# Patient Record
Sex: MALE | Race: WHITE | Employment: FULL TIME | URBAN - METROPOLITAN AREA
[De-identification: names, ages, dates, MRNs, and addresses within clinical notes are randomized per-mention and may not be internally consistent; named-entity substitution may affect disease eponyms.]

---

## 2021-02-12 LAB
CREATININE, EXTERNAL: 0.86
HBA1C MFR BLD HPLC: 8.5 %
MICROALBUMIN UR TEST STR-MCNC: 36.2 MG/DL

## 2021-05-07 ENCOUNTER — OFFICE VISIT (OUTPATIENT)
Dept: ENDOCRINOLOGY | Age: 71
End: 2021-05-07
Payer: MEDICARE

## 2021-05-07 DIAGNOSIS — E11.65 TYPE 2 DIABETES MELLITUS WITH HYPERGLYCEMIA, WITHOUT LONG-TERM CURRENT USE OF INSULIN (HCC): Primary | ICD-10-CM

## 2021-05-07 PROCEDURE — 3046F HEMOGLOBIN A1C LEVEL >9.0%: CPT | Performed by: INTERNAL MEDICINE

## 2021-05-07 PROCEDURE — 99204 OFFICE O/P NEW MOD 45 MIN: CPT | Performed by: INTERNAL MEDICINE

## 2021-05-07 PROCEDURE — 1101F PT FALLS ASSESS-DOCD LE1/YR: CPT | Performed by: INTERNAL MEDICINE

## 2021-05-07 PROCEDURE — G8536 NO DOC ELDER MAL SCRN: HCPCS | Performed by: INTERNAL MEDICINE

## 2021-05-07 PROCEDURE — G8421 BMI NOT CALCULATED: HCPCS | Performed by: INTERNAL MEDICINE

## 2021-05-07 PROCEDURE — G8427 DOCREV CUR MEDS BY ELIG CLIN: HCPCS | Performed by: INTERNAL MEDICINE

## 2021-05-07 PROCEDURE — 3017F COLORECTAL CA SCREEN DOC REV: CPT | Performed by: INTERNAL MEDICINE

## 2021-05-07 PROCEDURE — 2022F DILAT RTA XM EVC RTNOPTHY: CPT | Performed by: INTERNAL MEDICINE

## 2021-05-07 PROCEDURE — G8432 DEP SCR NOT DOC, RNG: HCPCS | Performed by: INTERNAL MEDICINE

## 2021-05-07 RX ORDER — SITAGLIPTIN AND METFORMIN HYDROCHLORIDE 50; 1000 MG/1; MG/1
1 TABLET, FILM COATED ORAL 2 TIMES DAILY
COMMUNITY
Start: 2021-02-21

## 2021-05-07 RX ORDER — IRBESARTAN 300 MG/1
1 TABLET ORAL DAILY
COMMUNITY
Start: 2021-01-31

## 2021-05-07 RX ORDER — AMLODIPINE BESYLATE 5 MG/1
1 TABLET ORAL DAILY
COMMUNITY
Start: 2020-12-06

## 2021-05-07 RX ORDER — METOPROLOL SUCCINATE 100 MG/1
1 TABLET, EXTENDED RELEASE ORAL DAILY
COMMUNITY
Start: 2020-12-06

## 2021-05-07 RX ORDER — GLIMEPIRIDE 4 MG/1
1 TABLET ORAL 2 TIMES DAILY
COMMUNITY
Start: 2021-01-03

## 2021-05-07 RX ORDER — TERAZOSIN 5 MG/1
1 CAPSULE ORAL
COMMUNITY
Start: 2020-12-20

## 2021-05-07 RX ORDER — ROSUVASTATIN CALCIUM 40 MG/1
1 TABLET, COATED ORAL DAILY
COMMUNITY
Start: 2021-01-17

## 2021-05-07 RX ORDER — MINERAL OIL
180 ENEMA (ML) RECTAL DAILY
COMMUNITY

## 2021-05-07 RX ORDER — HYDROCHLOROTHIAZIDE 25 MG/1
1 TABLET ORAL DAILY
COMMUNITY
Start: 2020-12-27

## 2021-05-07 NOTE — PROGRESS NOTES
HPI:  This is  a 71-year-old white male with a history of type 2 diabetes mellitus diagnosed about 10 years ago who has been on a number of different medications for his diabetes referred for evaluation and management. He tells me that he has been on and off Metformin and glimepiride. He was previously seen at [facility] [place] and lost 10 pounds and improved his blood sugar but has since regained that weight. His most recent A1c is 8.5%. Past medical history   he has a history of coronary disease  he has a history of prostate cancer  he has a history of DVT    Current diabetes medications  Janumet 50/1000 twice daily  Jardiance 25 mg daily  Glimepiride 4 mg daily    He checks his blood sugars in the morning and for the last year they have ranged between 202 150. He is very fastidious about recording his blood sugars every morning. The average for the last year is 236. Breakfast is usually a bowl of cereal and a diet soda. Usually does not have lunch. Dinner can be a taco salad or steak potatoes and green beans or stirfry with fried rice or spaghetti or too much younger. He snacks on nuts or occasionally another sandwich at bedtime. He is minimally physically active. Review of Systems - General ROS: negative  Psychological ROS: negative  Ophthalmic ROS: negative  ENT ROS: negative  Respiratory ROS: no cough, shortness of breath, or wheezing  Cardiovascular ROS: no chest pain or dyspnea on exertion  Gastrointestinal ROS: no abdominal pain, change in bowel habits, or black or bloody stools  Genito-Urinary ROS: positive for - urinary frequency/urgency  Musculoskeletal ROS: negative  Neurological ROS: no TIA or stroke symptoms  Dermatological ROS: negative    Emanation  Blood pressure 152/96  Pulse 95  Weight 100 kg  HEENT unremarkable  Lungs clear  Heart reveals a regular rate and rhythm without murmurs rubs or gallops  Abdomen obese  Extremities unremarkable.   The feet are dry without ulceration or tissue breakdown. Diabetic foot exam:     Left Foot:   Visual Exam: normal    Pulse DP: 2+ (normal)   Filament test: normal sensation    Vibratory sensation: normal      Right Foot:   Visual Exam: normal    Pulse DP: 2+ (normal)   Filament test: normal sensation    Vibratory sensation: normal          Impression   1. type 2 diabetes mellitus with a recent A1c of 8.5% with an average glucose of 236 fasting  2. History of DVT  3. Obesity  4. financial constraints    Plan: We discussed at length all options in terms of pharmacologic therapy for his type 2 diabetes mellitus. I advised him that we needed to get his fasting glucose less than 150. We have elected to do the followin. I have continue the above regimen for the short-term  2. I have written a prescription for Ozempic to be titrated if affordable  3. I advised him that I doubted that the [address] would be affordable and certainly more expensive than the Jardiance which he already struggles to pay for  4. If Ozempic and or other GLP's are not affordable or realistic, we discussed at length the addition of bedtime insulin as well as efforts to reduce his carbohydrate intake and increase his physical activity. He does tell me that he did this before with Ashely Lucas and believes he could do this again if he had to.  5.  I will see him back in 2 months.

## 2023-05-11 RX ORDER — IRBESARTAN 300 MG/1
1 TABLET ORAL DAILY
COMMUNITY
Start: 2021-01-31

## 2023-05-11 RX ORDER — ROSUVASTATIN CALCIUM 40 MG/1
1 TABLET, COATED ORAL DAILY
COMMUNITY
Start: 2021-01-17

## 2023-05-11 RX ORDER — SITAGLIPTIN AND METFORMIN HYDROCHLORIDE 1000; 50 MG/1; MG/1
1 TABLET, FILM COATED ORAL 2 TIMES DAILY
COMMUNITY
Start: 2021-02-21

## 2023-05-11 RX ORDER — AMLODIPINE BESYLATE 5 MG/1
1 TABLET ORAL DAILY
COMMUNITY
Start: 2020-12-06

## 2023-05-11 RX ORDER — HYDROCHLOROTHIAZIDE 25 MG/1
1 TABLET ORAL DAILY
COMMUNITY
Start: 2020-12-27

## 2023-05-11 RX ORDER — FEXOFENADINE HCL 180 MG/1
180 TABLET ORAL DAILY
COMMUNITY

## 2023-05-11 RX ORDER — METOPROLOL SUCCINATE 100 MG/1
1 TABLET, EXTENDED RELEASE ORAL DAILY
COMMUNITY
Start: 2020-12-06

## 2023-05-11 RX ORDER — TERAZOSIN 5 MG/1
1 CAPSULE ORAL
COMMUNITY
Start: 2020-12-20

## 2023-05-11 RX ORDER — GLIMEPIRIDE 4 MG/1
1 TABLET ORAL 2 TIMES DAILY
COMMUNITY
Start: 2021-01-03

## 2023-08-10 ENCOUNTER — HOSPITAL ENCOUNTER (OUTPATIENT)
Facility: HOSPITAL | Age: 73
Discharge: HOME | End: 2023-08-10
Payer: MEDICARE

## 2023-08-10 DIAGNOSIS — R05.9 COUGH, UNSPECIFIED TYPE: ICD-10-CM

## 2023-08-10 PROCEDURE — 71046 X-RAY EXAM CHEST 2 VIEWS: CPT

## 2023-09-21 ENCOUNTER — TRANSCRIBE ORDERS (OUTPATIENT)
Facility: HOSPITAL | Age: 73
End: 2023-09-21

## 2023-09-21 ENCOUNTER — HOSPITAL ENCOUNTER (OUTPATIENT)
Facility: HOSPITAL | Age: 73
Discharge: HOME | End: 2023-09-21
Payer: MEDICARE

## 2023-09-21 DIAGNOSIS — R93.89 ABNORMAL CHEST CT: Primary | ICD-10-CM

## 2023-09-21 DIAGNOSIS — R93.89 ABNORMAL CHEST CT: ICD-10-CM

## 2023-09-21 PROCEDURE — 71046 X-RAY EXAM CHEST 2 VIEWS: CPT

## 2024-04-02 ENCOUNTER — HOSPITAL ENCOUNTER (INPATIENT)
Facility: HOSPITAL | Age: 74
LOS: 7 days | Discharge: HOME | DRG: 193 | End: 2024-04-09
Attending: EMERGENCY MEDICINE | Admitting: STUDENT IN AN ORGANIZED HEALTH CARE EDUCATION/TRAINING PROGRAM
Payer: MEDICARE

## 2024-04-02 ENCOUNTER — APPOINTMENT (OUTPATIENT)
Facility: HOSPITAL | Age: 74
DRG: 193 | End: 2024-04-02
Payer: MEDICARE

## 2024-04-02 DIAGNOSIS — Z79.4 TYPE 2 DIABETES MELLITUS WITH HYPERGLYCEMIA, WITH LONG-TERM CURRENT USE OF INSULIN (HCC): ICD-10-CM

## 2024-04-02 DIAGNOSIS — E11.65 TYPE 2 DIABETES MELLITUS WITH HYPERGLYCEMIA, WITH LONG-TERM CURRENT USE OF INSULIN (HCC): ICD-10-CM

## 2024-04-02 DIAGNOSIS — J96.01 ACUTE RESPIRATORY FAILURE WITH HYPOXIA (HCC): Primary | ICD-10-CM

## 2024-04-02 DIAGNOSIS — E11.65 TYPE 2 DIABETES MELLITUS WITH HYPERGLYCEMIA (HCC): ICD-10-CM

## 2024-04-02 PROBLEM — J18.9 PNEUMONIA DUE TO INFECTIOUS AGENT: Status: ACTIVE | Noted: 2024-04-02

## 2024-04-02 LAB
ALBUMIN SERPL-MCNC: 2.4 G/DL (ref 3.5–5)
ALBUMIN/GLOB SERPL: 0.4 (ref 1.1–2.2)
ALP SERPL-CCNC: 67 U/L (ref 45–117)
ALT SERPL-CCNC: 17 U/L (ref 12–78)
ANION GAP SERPL CALC-SCNC: 5 MMOL/L (ref 5–15)
AST SERPL-CCNC: 11 U/L (ref 15–37)
BASOPHILS # BLD: 0 K/UL (ref 0–0.1)
BASOPHILS NFR BLD: 0 % (ref 0–1)
BILIRUB SERPL-MCNC: 0.3 MG/DL (ref 0.2–1)
BUN SERPL-MCNC: 21 MG/DL (ref 6–20)
BUN/CREAT SERPL: 23 (ref 12–20)
CALCIUM SERPL-MCNC: 8.6 MG/DL (ref 8.5–10.1)
CHLORIDE SERPL-SCNC: 100 MMOL/L (ref 97–108)
CO2 SERPL-SCNC: 29 MMOL/L (ref 21–32)
CREAT SERPL-MCNC: 0.92 MG/DL (ref 0.7–1.3)
DIFFERENTIAL METHOD BLD: ABNORMAL
EOSINOPHIL # BLD: 0.1 K/UL (ref 0–0.4)
EOSINOPHIL NFR BLD: 1 % (ref 0–7)
ERYTHROCYTE [DISTWIDTH] IN BLOOD BY AUTOMATED COUNT: 14.5 % (ref 11.5–14.5)
FLUAV AG NPH QL IA: NEGATIVE
FLUBV AG NOSE QL IA: NEGATIVE
GLOBULIN SER CALC-MCNC: 6.4 G/DL (ref 2–4)
GLUCOSE SERPL-MCNC: 127 MG/DL (ref 65–100)
HCT VFR BLD AUTO: 39.6 % (ref 36.6–50.3)
HGB BLD-MCNC: 12.4 G/DL (ref 12.1–17)
IMM GRANULOCYTES # BLD AUTO: 0 K/UL (ref 0–0.04)
IMM GRANULOCYTES NFR BLD AUTO: 0 % (ref 0–0.5)
LYMPHOCYTES # BLD: 2.1 K/UL (ref 0.8–3.5)
LYMPHOCYTES NFR BLD: 18 % (ref 12–49)
MCH RBC QN AUTO: 29.5 PG (ref 26–34)
MCHC RBC AUTO-ENTMCNC: 31.3 G/DL (ref 30–36.5)
MCV RBC AUTO: 94.1 FL (ref 80–99)
MONOCYTES # BLD: 0.9 K/UL (ref 0–1)
MONOCYTES NFR BLD: 8 % (ref 5–13)
NEUTS BAND NFR BLD MANUAL: 3 %
NEUTS SEG # BLD: 8.4 K/UL (ref 1.8–8)
NEUTS SEG NFR BLD: 70 % (ref 32–75)
NRBC # BLD: 0 K/UL (ref 0–0.01)
NRBC BLD-RTO: 0 PER 100 WBC
NT PRO BNP: 328 PG/ML
PLATELET # BLD AUTO: 348 K/UL (ref 150–400)
PMV BLD AUTO: 8.6 FL (ref 8.9–12.9)
POTASSIUM SERPL-SCNC: 3.9 MMOL/L (ref 3.5–5.1)
PROT SERPL-MCNC: 8.8 G/DL (ref 6.4–8.2)
RBC # BLD AUTO: 4.21 M/UL (ref 4.1–5.7)
RBC MORPH BLD: ABNORMAL
SARS-COV-2 RDRP RESP QL NAA+PROBE: NOT DETECTED
SODIUM SERPL-SCNC: 134 MMOL/L (ref 136–145)
SOURCE: NORMAL
TROPONIN I SERPL HS-MCNC: 5 NG/L (ref 0–76)
WBC # BLD AUTO: 11.5 K/UL (ref 4.1–11.1)

## 2024-04-02 PROCEDURE — 6360000002 HC RX W HCPCS: Performed by: STUDENT IN AN ORGANIZED HEALTH CARE EDUCATION/TRAINING PROGRAM

## 2024-04-02 PROCEDURE — 2580000003 HC RX 258: Performed by: STUDENT IN AN ORGANIZED HEALTH CARE EDUCATION/TRAINING PROGRAM

## 2024-04-02 PROCEDURE — 87635 SARS-COV-2 COVID-19 AMP PRB: CPT

## 2024-04-02 PROCEDURE — 36415 COLL VENOUS BLD VENIPUNCTURE: CPT

## 2024-04-02 PROCEDURE — 1100000000 HC RM PRIVATE

## 2024-04-02 PROCEDURE — 84484 ASSAY OF TROPONIN QUANT: CPT

## 2024-04-02 PROCEDURE — 99285 EMERGENCY DEPT VISIT HI MDM: CPT

## 2024-04-02 PROCEDURE — 85025 COMPLETE CBC W/AUTO DIFF WBC: CPT

## 2024-04-02 PROCEDURE — 6370000000 HC RX 637 (ALT 250 FOR IP): Performed by: EMERGENCY MEDICINE

## 2024-04-02 PROCEDURE — 83880 ASSAY OF NATRIURETIC PEPTIDE: CPT

## 2024-04-02 PROCEDURE — 93005 ELECTROCARDIOGRAM TRACING: CPT | Performed by: EMERGENCY MEDICINE

## 2024-04-02 PROCEDURE — 96365 THER/PROPH/DIAG IV INF INIT: CPT

## 2024-04-02 PROCEDURE — 2580000003 HC RX 258: Performed by: EMERGENCY MEDICINE

## 2024-04-02 PROCEDURE — 6360000002 HC RX W HCPCS: Performed by: EMERGENCY MEDICINE

## 2024-04-02 PROCEDURE — 87804 INFLUENZA ASSAY W/OPTIC: CPT

## 2024-04-02 PROCEDURE — 80053 COMPREHEN METABOLIC PANEL: CPT

## 2024-04-02 PROCEDURE — 71275 CT ANGIOGRAPHY CHEST: CPT

## 2024-04-02 RX ORDER — MORPHINE SULFATE 2 MG/ML
2 INJECTION, SOLUTION INTRAMUSCULAR; INTRAVENOUS EVERY 4 HOURS PRN
Status: DISCONTINUED | OUTPATIENT
Start: 2024-04-02 | End: 2024-04-03

## 2024-04-02 RX ORDER — ACETAMINOPHEN 325 MG/1
650 TABLET ORAL EVERY 4 HOURS PRN
Status: DISCONTINUED | OUTPATIENT
Start: 2024-04-02 | End: 2024-04-09 | Stop reason: HOSPADM

## 2024-04-02 RX ORDER — DOXYCYCLINE HYCLATE 100 MG
100 TABLET ORAL
Status: COMPLETED | OUTPATIENT
Start: 2024-04-02 | End: 2024-04-02

## 2024-04-02 RX ORDER — HYDROCODONE BITARTRATE AND ACETAMINOPHEN 5; 325 MG/1; MG/1
1 TABLET ORAL EVERY 6 HOURS PRN
Status: DISCONTINUED | OUTPATIENT
Start: 2024-04-02 | End: 2024-04-09 | Stop reason: HOSPADM

## 2024-04-02 RX ORDER — AZITHROMYCIN 250 MG/1
500 TABLET, FILM COATED ORAL ONCE
Status: COMPLETED | OUTPATIENT
Start: 2024-04-03 | End: 2024-04-03

## 2024-04-02 RX ORDER — AZITHROMYCIN 250 MG/1
250 TABLET, FILM COATED ORAL DAILY
Status: DISCONTINUED | OUTPATIENT
Start: 2024-04-04 | End: 2024-04-06

## 2024-04-02 RX ADMIN — SODIUM CHLORIDE 1000 MG: 900 INJECTION INTRAVENOUS at 20:51

## 2024-04-02 RX ADMIN — DOXYCYCLINE HYCLATE 100 MG: 100 TABLET, COATED ORAL at 20:51

## 2024-04-02 RX ADMIN — PIPERACILLIN SODIUM AND TAZOBACTAM SODIUM 4500 MG: 4; .5 INJECTION, POWDER, LYOPHILIZED, FOR SOLUTION INTRAVENOUS at 23:33

## 2024-04-02 ASSESSMENT — PAIN SCALES - GENERAL: PAINLEVEL_OUTOF10: 0

## 2024-04-02 ASSESSMENT — PAIN - FUNCTIONAL ASSESSMENT: PAIN_FUNCTIONAL_ASSESSMENT: 0-10

## 2024-04-02 ASSESSMENT — LIFESTYLE VARIABLES
HOW OFTEN DO YOU HAVE A DRINK CONTAINING ALCOHOL: NEVER
HOW MANY STANDARD DRINKS CONTAINING ALCOHOL DO YOU HAVE ON A TYPICAL DAY: PATIENT DOES NOT DRINK

## 2024-04-02 NOTE — ED PROVIDER NOTES
[facility] EMERGENCY DEPT  EMERGENCY DEPARTMENT ENCOUNTER       Pt Name: Irwin Pineda  MRN: [id number]  Birthdate 1950  Date of evaluation: 4/2/2024  Provider: González Rodas MD   PCP: Eric Garza MD  Note Started: 4:54 PM EDT 4/2/24     CHIEF COMPLAINT       Chief Complaint   Patient presents with    Shortness of Breath     PCP referred patient to ED for Hypoxia today in office. Patient denies pain or SOB while resting; no hx of COPD, no recent illnesses. 87% RA in triage; placed on 2 L NC.        HISTORY OF PRESENT ILLNESS: 1 or more elements      History From: patient, History limited by: none     Irwin Pineda is a 73 y.o. male with a history of prior DVT not on anticoagulation presents emergency department with hypoxia.    Patient reports 2 weeks ago noted dyspnea on exertion.  No chest pain at that time.  Does report cough.  Over the past 2 days has noted increased fatigue.  Denies hemoptysis.  No leg swelling.  Does report sleeping on his abdomen improved symptoms.  Denies fevers or chills.    Went to PCP, there was hypoxic to 89 and 82 on ambulation.     Please See MDM for Additional Details of the HPI/PMH  Nursing Notes were all reviewed and agreed with or any disagreements were addressed in the HPI.     REVIEW OF SYSTEMS        Positives and Pertinent negatives as per HPI.    PAST HISTORY     Past Medical History:  No past medical history on file.    Past Surgical History:  No past surgical history on file.    Family History:  No family history on file.    Social History:       Allergies:  Allergies   Allergen Reactions    Aspirin Swelling       CURRENT MEDICATIONS      Previous Medications    AMLODIPINE (NORVASC) 5 MG TABLET    Take 1 tablet by mouth daily    EMPAGLIFLOZIN (JARDIANCE) 25 MG TABLET    Take 1 tablet by mouth daily    FEXOFENADINE (ALLEGRA) 180 MG TABLET    Take 1 tablet by mouth daily    GLIMEPIRIDE (AMARYL) 4 MG TABLET    Take 1 tablet by mouth 2 times daily     Glom Filt Rate 89 >60 ml/min/1.73m2    Calcium 8.6 8.5 - 10.1 MG/DL    Total Bilirubin 0.3 0.2 - 1.0 MG/DL    ALT 17 12 - 78 U/L    AST 11 (L) 15 - 37 U/L    Alk Phosphatase 67 45 - 117 U/L    Total Protein 8.8 (H) 6.4 - 8.2 g/dL    Albumin 2.4 (L) 3.5 - 5.0 g/dL    Globulin 6.4 (H) 2.0 - 4.0 g/dL    Albumin/Globulin Ratio 0.4 (L) 1.1 - 2.2     Troponin    Collection Time: 04/02/24  5:16 PM   Result Value Ref Range    Troponin, High Sensitivity 5 0 - 76 ng/L   Brain Natriuretic Peptide    Collection Time: 04/02/24  5:16 PM   Result Value Ref Range    NT Pro- (H) <125 PG/ML   COVID-19, Rapid    Collection Time: 04/02/24  7:20 PM    Specimen: Nasopharyngeal   Result Value Ref Range    Source Nasopharyngeal      SARS-CoV-2, Rapid Not detected NOTD     Rapid influenza A/B antigens    Collection Time: 04/02/24  7:20 PM    Specimen: Nasopharyngeal   Result Value Ref Range    Influenza A Ag Negative NEG      Influenza B Ag Negative NEG         EKG: If performed, independent interpretation documented below in the MDM section     RADIOLOGY:  Non-plain film images such as CT, Ultrasound and MRI are read by the radiologist. Plain radiographic images are visualized and preliminarily interpreted by the ED Provider with the findings documented in the MDM section.     Interpretation per the Radiologist below, if available at the time of this note:     CTA CHEST W WO CONTRAST   Final Result      1. Negative for pulmonary embolus.   2. Significant airspace disease in the right lung likely due to infection.   3. Area of consolidation in the posterior left lower lobe likely related to   infection as well.   4. Bilateral hilar angel enlargement. Several enlarged lymph nodes in the   mediastinum. These are likely reactive.              PROCEDURES   Unless otherwise noted below, none  Procedures     CRITICAL CARE TIME   0    EMERGENCY DEPARTMENT COURSE and DIFFERENTIAL DIAGNOSIS/MDM   Vitals:    Vitals:    04/02/24 1815 04/02/24

## 2024-04-03 ENCOUNTER — APPOINTMENT (OUTPATIENT)
Facility: HOSPITAL | Age: 74
DRG: 193 | End: 2024-04-03
Attending: STUDENT IN AN ORGANIZED HEALTH CARE EDUCATION/TRAINING PROGRAM
Payer: MEDICARE

## 2024-04-03 LAB
ANION GAP SERPL CALC-SCNC: 4 MMOL/L (ref 5–15)
BUN SERPL-MCNC: 22 MG/DL (ref 6–20)
BUN/CREAT SERPL: 24 (ref 12–20)
CALCIUM SERPL-MCNC: 9.4 MG/DL (ref 8.5–10.1)
CHLORIDE SERPL-SCNC: 102 MMOL/L (ref 97–108)
CO2 SERPL-SCNC: 30 MMOL/L (ref 21–32)
CREAT SERPL-MCNC: 0.9 MG/DL (ref 0.7–1.3)
ECHO AV AREA PEAK VELOCITY: 2.5 CM2
ECHO AV AREA PEAK VELOCITY: 2.5 CM2
ECHO AV AREA PEAK VELOCITY: 2.6 CM2
ECHO AV AREA PEAK VELOCITY: 2.6 CM2
ECHO AV AREA VTI: 2.5 CM2
ECHO AV AREA/BSA VTI: 1.2 CM2/M2
ECHO AV CUSP MM: 2.4 CM
ECHO AV MEAN GRADIENT: 5 MMHG
ECHO AV MEAN VELOCITY: 1 M/S
ECHO AV PEAK GRADIENT: 8 MMHG
ECHO AV PEAK GRADIENT: 8 MMHG
ECHO AV PEAK VELOCITY: 1.4 M/S
ECHO AV PEAK VELOCITY: 1.4 M/S
ECHO AV VTI: 23.9 CM
ECHO BSA: 2.08 M2
ECHO LA DIAMETER INDEX: 2.25 CM/M2
ECHO LA DIAMETER: 4.6 CM
ECHO LA VOL A-L A2C: 106 ML (ref 18–58)
ECHO LA VOL A-L A4C: 78 ML (ref 18–58)
ECHO LA VOL MOD A2C: 94 ML (ref 18–58)
ECHO LA VOL MOD A4C: 76 ML (ref 18–58)
ECHO LA VOLUME AREA LENGTH: 91 ML
ECHO LA VOLUME INDEX A-L A2C: 52 ML/M2 (ref 16–34)
ECHO LA VOLUME INDEX A-L A4C: 38 ML/M2 (ref 16–34)
ECHO LA VOLUME INDEX AREA LENGTH: 45 ML/M2 (ref 16–34)
ECHO LA VOLUME INDEX MOD A2C: 46 ML/M2 (ref 16–34)
ECHO LA VOLUME INDEX MOD A4C: 37 ML/M2 (ref 16–34)
ECHO LV E' LATERAL VELOCITY: 8 CM/S
ECHO LV E' SEPTAL VELOCITY: 6 CM/S
ECHO LV FRACTIONAL SHORTENING: 33 % (ref 28–44)
ECHO LV INTERNAL DIMENSION DIASTOLE INDEX: 2.5 CM/M2
ECHO LV INTERNAL DIMENSION DIASTOLIC: 5.1 CM (ref 4.2–5.9)
ECHO LV INTERNAL DIMENSION SYSTOLIC INDEX: 1.67 CM/M2
ECHO LV INTERNAL DIMENSION SYSTOLIC: 3.4 CM
ECHO LV IVSD: 1.5 CM (ref 0.6–1)
ECHO LV MASS 2D: 300.4 G (ref 88–224)
ECHO LV MASS INDEX 2D: 147.3 G/M2 (ref 49–115)
ECHO LV POSTERIOR WALL DIASTOLIC: 1.3 CM (ref 0.6–1)
ECHO LV RELATIVE WALL THICKNESS RATIO: 0.51
ECHO LVOT AREA: 3.5 CM2
ECHO LVOT AV VTI INDEX: 0.74
ECHO LVOT DIAM: 2.1 CM
ECHO LVOT MEAN GRADIENT: 2 MMHG
ECHO LVOT PEAK GRADIENT: 5 MMHG
ECHO LVOT PEAK GRADIENT: 5 MMHG
ECHO LVOT PEAK VELOCITY: 1.1 M/S
ECHO LVOT PEAK VELOCITY: 1.1 M/S
ECHO LVOT STROKE VOLUME INDEX: 30.2 ML/M2
ECHO LVOT SV: 61.6 ML
ECHO LVOT VTI: 17.8 CM
ECHO MV A VELOCITY: 0.96 M/S
ECHO MV AREA VTI: 2.5 CM2
ECHO MV E DECELERATION TIME (DT): 211.3 MS
ECHO MV E VELOCITY: 0.57 M/S
ECHO MV E/A RATIO: 0.59
ECHO MV E/E' LATERAL: 7.13
ECHO MV E/E' RATIO (AVERAGED): 8.31
ECHO MV LVOT VTI INDEX: 1.4
ECHO MV MAX VELOCITY: 1.1 M/S
ECHO MV MEAN GRADIENT: 2 MMHG
ECHO MV MEAN VELOCITY: 0.6 M/S
ECHO MV PEAK GRADIENT: 5 MMHG
ECHO MV VTI: 24.9 CM
ECHO PV MAX VELOCITY: 0.9 M/S
ECHO PV PEAK GRADIENT: 3 MMHG
ECHO RV BASAL DIMENSION: 4.3 CM
ECHO RV FREE WALL PEAK S': 23 CM/S
ECHO RV INTERNAL DIMENSION: 3.9 CM
EKG ATRIAL RATE: 80 BPM
EKG DIAGNOSIS: NORMAL
EKG P AXIS: 33 DEGREES
EKG P-R INTERVAL: 166 MS
EKG Q-T INTERVAL: 398 MS
EKG QRS DURATION: 82 MS
EKG QTC CALCULATION (BAZETT): 459 MS
EKG R AXIS: 15 DEGREES
EKG T AXIS: 17 DEGREES
EKG VENTRICULAR RATE: 80 BPM
GLUCOSE BLD STRIP.AUTO-MCNC: 142 MG/DL (ref 65–117)
GLUCOSE BLD STRIP.AUTO-MCNC: 185 MG/DL (ref 65–117)
GLUCOSE BLD STRIP.AUTO-MCNC: 186 MG/DL (ref 65–117)
GLUCOSE BLD STRIP.AUTO-MCNC: 226 MG/DL (ref 65–117)
GLUCOSE BLD STRIP.AUTO-MCNC: 275 MG/DL (ref 65–117)
GLUCOSE SERPL-MCNC: 139 MG/DL (ref 65–100)
POTASSIUM SERPL-SCNC: 3.6 MMOL/L (ref 3.5–5.1)
PROCALCITONIN SERPL-MCNC: 0.05 NG/ML
SERVICE CMNT-IMP: ABNORMAL
SODIUM SERPL-SCNC: 136 MMOL/L (ref 136–145)

## 2024-04-03 PROCEDURE — 2580000003 HC RX 258: Performed by: STUDENT IN AN ORGANIZED HEALTH CARE EDUCATION/TRAINING PROGRAM

## 2024-04-03 PROCEDURE — 1100000003 HC PRIVATE W/ TELEMETRY

## 2024-04-03 PROCEDURE — 6360000002 HC RX W HCPCS: Performed by: STUDENT IN AN ORGANIZED HEALTH CARE EDUCATION/TRAINING PROGRAM

## 2024-04-03 PROCEDURE — 93306 TTE W/DOPPLER COMPLETE: CPT

## 2024-04-03 PROCEDURE — 80048 BASIC METABOLIC PNL TOTAL CA: CPT

## 2024-04-03 PROCEDURE — 94760 N-INVAS EAR/PLS OXIMETRY 1: CPT

## 2024-04-03 PROCEDURE — 84145 PROCALCITONIN (PCT): CPT

## 2024-04-03 PROCEDURE — 94640 AIRWAY INHALATION TREATMENT: CPT

## 2024-04-03 PROCEDURE — 6370000000 HC RX 637 (ALT 250 FOR IP): Performed by: NURSE PRACTITIONER

## 2024-04-03 PROCEDURE — 6370000000 HC RX 637 (ALT 250 FOR IP): Performed by: STUDENT IN AN ORGANIZED HEALTH CARE EDUCATION/TRAINING PROGRAM

## 2024-04-03 PROCEDURE — 36415 COLL VENOUS BLD VENIPUNCTURE: CPT

## 2024-04-03 PROCEDURE — 6360000002 HC RX W HCPCS: Performed by: NURSE PRACTITIONER

## 2024-04-03 PROCEDURE — 82962 GLUCOSE BLOOD TEST: CPT

## 2024-04-03 PROCEDURE — 2700000000 HC OXYGEN THERAPY PER DAY

## 2024-04-03 RX ORDER — ENOXAPARIN SODIUM 100 MG/ML
40 INJECTION SUBCUTANEOUS DAILY
Status: DISCONTINUED | OUTPATIENT
Start: 2024-04-03 | End: 2024-04-09 | Stop reason: HOSPADM

## 2024-04-03 RX ORDER — INSULIN LISPRO 100 [IU]/ML
0-4 INJECTION, SOLUTION INTRAVENOUS; SUBCUTANEOUS NIGHTLY
Status: DISCONTINUED | OUTPATIENT
Start: 2024-04-03 | End: 2024-04-09 | Stop reason: HOSPADM

## 2024-04-03 RX ORDER — GUAIFENESIN 600 MG/1
600 TABLET, EXTENDED RELEASE ORAL 2 TIMES DAILY
Status: DISCONTINUED | OUTPATIENT
Start: 2024-04-03 | End: 2024-04-06

## 2024-04-03 RX ORDER — METOPROLOL SUCCINATE 50 MG/1
100 TABLET, EXTENDED RELEASE ORAL DAILY
Status: DISCONTINUED | OUTPATIENT
Start: 2024-04-03 | End: 2024-04-09 | Stop reason: HOSPADM

## 2024-04-03 RX ORDER — INSULIN LISPRO 100 [IU]/ML
0-8 INJECTION, SOLUTION INTRAVENOUS; SUBCUTANEOUS
Status: DISCONTINUED | OUTPATIENT
Start: 2024-04-03 | End: 2024-04-09 | Stop reason: HOSPADM

## 2024-04-03 RX ORDER — IPRATROPIUM BROMIDE AND ALBUTEROL SULFATE 2.5; .5 MG/3ML; MG/3ML
1 SOLUTION RESPIRATORY (INHALATION) EVERY 4 HOURS PRN
Status: DISCONTINUED | OUTPATIENT
Start: 2024-04-03 | End: 2024-04-09 | Stop reason: HOSPADM

## 2024-04-03 RX ORDER — SODIUM CHLORIDE 0.9 % (FLUSH) 0.9 %
5-40 SYRINGE (ML) INJECTION EVERY 12 HOURS SCHEDULED
Status: DISCONTINUED | OUTPATIENT
Start: 2024-04-03 | End: 2024-04-09 | Stop reason: HOSPADM

## 2024-04-03 RX ORDER — POLYETHYLENE GLYCOL 3350 17 G/17G
17 POWDER, FOR SOLUTION ORAL DAILY PRN
Status: DISCONTINUED | OUTPATIENT
Start: 2024-04-03 | End: 2024-04-09 | Stop reason: HOSPADM

## 2024-04-03 RX ORDER — SODIUM CHLORIDE 0.9 % (FLUSH) 0.9 %
5-40 SYRINGE (ML) INJECTION PRN
Status: DISCONTINUED | OUTPATIENT
Start: 2024-04-03 | End: 2024-04-09 | Stop reason: HOSPADM

## 2024-04-03 RX ORDER — ROSUVASTATIN CALCIUM 40 MG/1
40 TABLET, COATED ORAL DAILY
Status: DISCONTINUED | OUTPATIENT
Start: 2024-04-03 | End: 2024-04-09 | Stop reason: HOSPADM

## 2024-04-03 RX ORDER — AMLODIPINE BESYLATE 5 MG/1
5 TABLET ORAL DAILY
Status: DISCONTINUED | OUTPATIENT
Start: 2024-04-03 | End: 2024-04-09 | Stop reason: HOSPADM

## 2024-04-03 RX ORDER — SENNA AND DOCUSATE SODIUM 50; 8.6 MG/1; MG/1
2 TABLET, FILM COATED ORAL DAILY PRN
Status: DISCONTINUED | OUTPATIENT
Start: 2024-04-03 | End: 2024-04-09 | Stop reason: HOSPADM

## 2024-04-03 RX ORDER — SODIUM CHLORIDE 9 MG/ML
INJECTION, SOLUTION INTRAVENOUS PRN
Status: DISCONTINUED | OUTPATIENT
Start: 2024-04-03 | End: 2024-04-09 | Stop reason: HOSPADM

## 2024-04-03 RX ORDER — BENZONATATE 100 MG/1
100 CAPSULE ORAL 3 TIMES DAILY PRN
Status: DISCONTINUED | OUTPATIENT
Start: 2024-04-03 | End: 2024-04-09 | Stop reason: HOSPADM

## 2024-04-03 RX ORDER — GUAIFENESIN 400 MG/1
400 TABLET ORAL 4 TIMES DAILY PRN
COMMUNITY

## 2024-04-03 RX ORDER — DEXTROSE MONOHYDRATE 100 MG/ML
INJECTION, SOLUTION INTRAVENOUS CONTINUOUS PRN
Status: DISCONTINUED | OUTPATIENT
Start: 2024-04-03 | End: 2024-04-09 | Stop reason: HOSPADM

## 2024-04-03 RX ORDER — GLUCAGON 1 MG/ML
1 KIT INJECTION PRN
Status: DISCONTINUED | OUTPATIENT
Start: 2024-04-03 | End: 2024-04-09 | Stop reason: HOSPADM

## 2024-04-03 RX ORDER — SODIUM CHLORIDE 9 MG/ML
INJECTION, SOLUTION INTRAVENOUS CONTINUOUS
Status: DISCONTINUED | OUTPATIENT
Start: 2024-04-03 | End: 2024-04-03

## 2024-04-03 RX ORDER — HYDROCHLOROTHIAZIDE 25 MG/1
25 TABLET ORAL DAILY
Status: DISCONTINUED | OUTPATIENT
Start: 2024-04-03 | End: 2024-04-09 | Stop reason: HOSPADM

## 2024-04-03 RX ADMIN — HYDROCHLOROTHIAZIDE 25 MG: 25 TABLET ORAL at 10:14

## 2024-04-03 RX ADMIN — SODIUM CHLORIDE: 9 INJECTION, SOLUTION INTRAVENOUS at 23:13

## 2024-04-03 RX ADMIN — EMPAGLIFLOZIN 25 MG: 25 TABLET, FILM COATED ORAL at 10:14

## 2024-04-03 RX ADMIN — VANCOMYCIN HYDROCHLORIDE 2000 MG: 10 INJECTION, POWDER, LYOPHILIZED, FOR SOLUTION INTRAVENOUS at 00:19

## 2024-04-03 RX ADMIN — AMLODIPINE BESYLATE 5 MG: 5 TABLET ORAL at 10:16

## 2024-04-03 RX ADMIN — ENOXAPARIN SODIUM 40 MG: 100 INJECTION SUBCUTANEOUS at 10:15

## 2024-04-03 RX ADMIN — ARFORMOTEROL TARTRATE: 15 SOLUTION RESPIRATORY (INHALATION) at 20:26

## 2024-04-03 RX ADMIN — GUAIFENESIN 600 MG: 600 TABLET, EXTENDED RELEASE ORAL at 22:51

## 2024-04-03 RX ADMIN — PIPERACILLIN AND TAZOBACTAM 3375 MG: 3; .375 INJECTION, POWDER, LYOPHILIZED, FOR SOLUTION INTRAVENOUS at 14:42

## 2024-04-03 RX ADMIN — AZITHROMYCIN 500 MG: 250 TABLET, FILM COATED ORAL at 10:15

## 2024-04-03 RX ADMIN — VANCOMYCIN HYDROCHLORIDE 1000 MG: 1 INJECTION, POWDER, LYOPHILIZED, FOR SOLUTION INTRAVENOUS at 13:15

## 2024-04-03 RX ADMIN — METOPROLOL SUCCINATE 100 MG: 50 TABLET, EXTENDED RELEASE ORAL at 10:15

## 2024-04-03 RX ADMIN — SODIUM CHLORIDE, PRESERVATIVE FREE 10 ML: 5 INJECTION INTRAVENOUS at 23:30

## 2024-04-03 RX ADMIN — ROSUVASTATIN CALCIUM 40 MG: 40 TABLET, FILM COATED ORAL at 10:14

## 2024-04-03 RX ADMIN — PIPERACILLIN AND TAZOBACTAM 3375 MG: 3; .375 INJECTION, POWDER, LYOPHILIZED, FOR SOLUTION INTRAVENOUS at 23:16

## 2024-04-03 RX ADMIN — PIPERACILLIN AND TAZOBACTAM 3375 MG: 3; .375 INJECTION, POWDER, LYOPHILIZED, FOR SOLUTION INTRAVENOUS at 06:05

## 2024-04-03 ASSESSMENT — PAIN SCALES - GENERAL
PAINLEVEL_OUTOF10: 0

## 2024-04-03 NOTE — PROGRESS NOTES
I prayed with Irwin and gave him a blessing. He is not Buddhist, which we changed in his chart. He was very happy with the visit.  Father Kenneth Guy

## 2024-04-03 NOTE — PROGRESS NOTES
Hospitalist Progress Note    NAME:   Irwin Pineda   : 1950   MRN: [id number]     Date/Time: 4/3/2024 12:26 PM  Patient PCP: Eric Garza MD    Estimated discharge date: greater than 24 hours  Barriers: Clinical stability. CM to follow for discharge planning. No home needs identified currently.      Assessment / Plan:  Acute hypoxemic respiratory failure        Right upper lobe CAP  - procalcitonin 0.05  - COVID in and influenza negative  - MRSA culture ordered  - CTA of chest shows:  Negative for pulmonary embolus.  2. Significant airspace disease in the right lung likely due to infection.  3. Area of consolidation in the posterior left lower lobe likely related to infection as well.  4. Bilateral hilar angel enlargement. Several enlarged lymph nodes in the mediastinum. These are likely reactive  - keep 02 sats greater than 92%  - incentive spirometry  - turn, cough and deep breathe  - prn and scheduled nebulizer treatments  - antitussive and mucolytic  - continue vancomycin, zosyn, and azithromycin  - oxygen challenge prior to discharge    2. Hyponatremia - resolved    3. Essential HTN      History of DVT      CAD  - continue norvasc, HCTZ, bb, statin    4. Type II DM  - hemoglobin A1c ordred  - hold metformin and sulfonylurea  - continue jardiance  - sliding scale insulin  - accuchecks  - diabetic diet  - hypoglycemia protocol    5. Prostate cancer       Status post resection  - follow up outpatient with cancer physician as outpatient.    Medical Decision Making:   I personally reviewed labs: yes  I personally reviewed imaging: yes  I personally reviewed EKG: yes  Toxic drug monitoring: Lovenox, H/H  Discussed case with: patient, nursing, CM, Dr. Dennis        Code Status: Full Code  DVT Prophylaxis: Lovenox    Subjective:     Chief Complaint / Reason for Physician Visit  \" I'm feeling better than on yesterday\"     He is a 73-year-old gentleman with past medical history significant

## 2024-04-03 NOTE — PROGRESS NOTES
Comprehensive Nutrition Assessment    Type and Reason for Visit:  Initial, Positive Nutrition Screen    Nutrition Recommendations/Plan:   Consistent carb diet (60g/meal)  Ensure high protein (lower CHO and higher protein than Glucerna)  Please document % meals and supplements consumed in flowsheet I/O's under intake      Malnutrition Assessment:  Malnutrition Status:  Severe malnutrition (04/03/24 1511)    Context:  Acute Illness     Findings of the 6 clinical characteristics of malnutrition:  Energy Intake:  50% or less of estimated energy requirements for 5 or more days  Weight Loss:  Greater than 5% over 1 month     Body Fat Loss:  No significant body fat loss     Muscle Mass Loss:  No significant muscle mass loss    Fluid Accumulation:  No significant fluid accumulation     Strength:  Not Performed    Nutrition Assessment:     Chart reviewed for MST. Pt medically noted for acute hypoxic respiratory failure, pneumonia, hyponatremia, HTN, DM2, CAD, hx prostate cancer. Pt reports a decreased appetite for the last 2 weeks 2/2 respiratory status. As a result he went from usual weight of 195# (~2 wks ago) to 183# yesterday at home, indicating 12#, 6% loss which is significant for the time frame. Current weight is 198# from the bed scale. Pt agreeable to low CHO supplements and adding carbohydrate restriction to his diet. Will continue monitoring.     Wt Readings from Last 5 Encounters:   04/03/24 89.8 kg (198 lb)   ]    Nutrition Related Findings:    Labs: [phone].   Meds: zithromax, jardiance, hydrodiuril, humalog, zosyn, crestor, vancomycin.  BM 4/2.   Wound Type: None       Current Nutrition Intake & Therapies:    Average Meal Intake: 51-75%  Average Supplements Intake: %  ADULT DIET; Regular; 4 carb choices (60 gm/meal); Splenda only with coffee/tea please. NO pink sweetener.  ADULT ORAL NUTRITION SUPPLEMENT; Breakfast, Lunch, Dinner; Low Calorie/High Protein Oral Supplement    Anthropometric  Measures:  Height: 172.7 cm (5' 7.99\")  Ideal Body Weight (IBW): 154 lbs (70 kg)       Current Body Weight: 83 kg (183 lb), 118.8 % IBW. Weight Source: Stated  Current BMI (kg/m2): 27.8        Weight Adjustment For: No Adjustment                 BMI Categories: Obese Class 1 (BMI 30.0-34.9)    Estimated Daily Nutrient Needs:  Energy Requirements Based On: Formula  Weight Used for Energy Requirements: Current  Energy (kcal/day): 2100 kcals (BMR x 1.3 AF)  Weight Used for Protein Requirements: Current  Protein (g/day): 90-108g (1.0-1.2g/kg)  Method Used for Fluid Requirements: 1 ml/kcal  Fluid (ml/day): 2100mL    Nutrition Diagnosis:   Unintended weight loss related to  (decreased appetite) as evidenced by weight loss    Nutrition Interventions:   Food and/or Nutrient Delivery: Modify Current Diet, Modify Oral Nutrition Supplement  Nutrition Education/Counseling: No recommendation at this time  Coordination of Nutrition Care: Continue to monitor while inpatient       Goals:     Goals: PO intake 75% or greater, by next RD assessment       Nutrition Monitoring and Evaluation:   Behavioral-Environmental Outcomes: None Identified  Food/Nutrient Intake Outcomes: Food and Nutrient Intake, Supplement Intake  Physical Signs/Symptoms Outcomes: Biochemical Data, GI Status, Nutrition Focused Physical Findings, Weight    Discharge Planning:    Continue current diet, Continue Oral Nutrition Supplement     Deloris Trejo RD  Contact: [phone]

## 2024-04-03 NOTE — PROGRESS NOTES
Pharmacy Antimicrobial Kinetic Dosing    Indication for Antimicrobials: CAP     Current Regimen of Each Antimicrobial:  Vancomycin - pharmacy dosing; Start Date ; Day # 1  Zosyn 4.5 g IV once then 3.375 g IV Q8H; Start Date ; Day # 1  Azithromycin 500 mg then 250 mg PO daily; Start Date 2; Day # 1    Previous Antimicrobial Therapy:  Doxy/ceftriaxone x 1     Goal Level: Vancomycin -600    Date Dose & Interval Measured (mcg/mL) Predicted AUC                       Significant Cultures:   NA    Labs:  Recent Labs     Units 24  1716   CREATININE MG/DL 0.92   BUN MG/DL 21*   WBC K/uL 11.5*   BANDS % 3     Temp (24hrs), Av.6 °F (37 °C), Min:98.5 °F (36.9 °C), Max:98.7 °F (37.1 °C)      Conditions for Dosing Consideration: None    Creatinine Clearance (mL/min): Estimated Creatinine Clearance: 80 mL/min (based on SCr of 0.92 mg/dL).       Impression/Plan:   Vancomycin 2000 mg IV once then 1000 mg IV Q12H, expected .  Zosyn dosing okay  MRSA Nares/procalcitonin ordered  Antimicrobial stop date 7 days     Pharmacy will follow daily and adjust medications as appropriate for renal function and/or serum levels.    Thank you,  Armando Oliveira, [facility]

## 2024-04-03 NOTE — CARE COORDINATION
Care Management Initial Assessment       RUR: 10%  Readmission? No  1st IM letter given? Yes - 4/3/24  1st  letter given: No        CM introduce self, explain role and confirmed demographics with pt.    Pt's son lives with pt in an one story home with no steps to enter.    No hx of home health, SNF or inpatient rehab.    Pt uses the [facility] on [address].    At the time of d/c pt will drive himself.    CM will follow and assist with d/c planning.       04/03/24 0924   Service Assessment   Patient Orientation Alert and Oriented   Cognition Alert   History Provided By Patient   Primary Caregiver Self   Support Systems Children   Patient's Healthcare Decision Maker is: Legal Next of Kin  (Neerualeks Beverly)   PCP Verified by CM Yes   Last Visit to PCP   (Saw a PCP yesterday)   Prior Functional Level Independent in ADLs/IADLs   Current Functional Level Independent in ADLs/IADLs   Can patient return to prior living arrangement Yes   Family able to assist with home care needs: No   Would you like for me to discuss the discharge plan with any other family members/significant others, and if so, who? Yes  (Pt's dtr Neeru Siria)   Financial Resources Medicare   Community Resources None   Social/Functional History   Lives With Son   Type of Home House   Home Equipment Wheelchair-manual;Cane;Grab bars  (Rails on toilet)   Active  Yes   Discharge Planning   Type of Residence House   Current Services Prior To Admission Durable Medical Equipment   Patient expects to be discharged to: House     Advance Care Planning     General Advance Care Planning (ACP) Conversation    Date of Conversation: 4/3/2024  Conducted with: Patient with Decision Making Capacity    Healthcare Decision Maker:  No healthcare decision makers have been documented.  Click here to complete HealthCare Decision Makers including selection of the Healthcare Decision Maker Relationship (ie \"Primary\")   Today we       Content/Action

## 2024-04-03 NOTE — ED NOTES
0155: TRANSFER - OUT REPORT:    Verbal report given to COU  on Irwin Pineda  being transferred to 1117 for routine progression of patient care       Report consisted of patient's Situation, Background, Assessment and   Recommendations(SBAR).     Information from the following report(s) Nurse Handoff Report was reviewed with the receiving nurse. Receiving RN had no questions    Icard Fall Assessment:    Presents to emergency department  because of falls (Syncope, seizure, or loss of consciousness): No  Age > 70: No  Altered Mental Status, Intoxication with alcohol or substance confusion (Disorientation, impaired judgment, poor safety awaremess, or inability to follow instructions): No  Impaired Mobility: Ambulates or transfers with assistive devices or assistance; Unable to ambulate or transer.: No  Nursing Judgement: No          Lines:   Peripheral IV 04/02/24 Left;Proximal Forearm (Active)        Opportunity for questions and clarification was provided.      Patient transported with:  Monitor, O2 @ 2lpm, and Tech

## 2024-04-03 NOTE — H&P
(HYDRODIURIL) 25 MG tablet Take 1 tablet by mouth daily 12/27/20  Yes Automatic Reconciliation, Ar   irbesartan (AVAPRO) 300 MG tablet Take 1 tablet by mouth daily 1/31/21  Yes Automatic Reconciliation, Ar   metoprolol succinate (TOPROL XL) 100 MG extended release tablet Take 1 tablet by mouth daily 12/6/20  Yes Automatic Reconciliation, Ar   rosuvastatin (CRESTOR) 40 MG tablet Take 1 tablet by mouth daily 1/17/21  Yes Automatic Reconciliation, Ar   terazosin (HYTRIN) 5 MG capsule Take 1 capsule by mouth nightly 12/20/20  Yes Automatic Reconciliation, Ar         Objective:   VITALS:    Patient Vitals for the past 24 hrs:   BP Temp Temp src Pulse Resp SpO2 Height Weight   04/03/24 0307 (!) 148/75 98.2 °F (36.8 °C) Oral 79 20 91 % -- --   04/03/24 0130 136/78 -- -- 77 16 91 % -- --   04/03/24 0127 -- -- -- 77 24 91 % -- --   04/03/24 0102 -- -- -- -- -- 91 % -- --   04/03/24 0100 (!) 142/71 -- -- 78 22 -- -- --   04/03/24 0049 -- -- -- -- -- 92 % -- --   04/03/24 0045 (!) 140/72 -- -- 80 26 -- -- --   04/03/24 0008 -- -- -- -- 19 -- -- --   04/02/24 2358 139/72 -- -- 77 -- -- -- --   04/02/24 2345 (!) 143/68 -- -- 79 23 -- -- --   04/02/24 2344 -- -- -- -- -- 90 % -- --   04/02/24 2315 (!) 144/78 -- -- 74 30 -- -- --   04/02/24 2234 -- -- -- -- 24 90 % -- --   04/02/24 2230 108/84 -- -- 81 -- -- -- --   04/02/24 2201 -- -- -- 78 21 -- -- --   04/02/24 2200 (!) 143/64 -- -- -- -- -- -- --   04/02/24 2131 -- -- -- -- 26 -- -- --   04/02/24 2130 132/72 -- -- 85 -- 91 % -- --   04/02/24 2123 -- -- -- 82 16 91 % -- --   04/02/24 2101 -- -- -- -- 24 -- -- --   04/02/24 2100 136/68 -- -- 83 -- 90 % -- --   04/02/24 2057 -- -- -- 82 17 -- -- --   04/02/24 2042 (!) 151/78 -- -- -- -- 92 % -- --   04/02/24 2027 129/73 -- -- 83 18 92 % -- --   04/02/24 2012 (!) 143/83 -- -- 87 (!) 33 91 % -- --   04/02/24 1958 (!) 143/76 -- -- 88 21 91 % -- --   04/02/24 1942 (!) 149/72 -- -- 82 23 -- -- --   04/02/24 1928 139/73 -- -- 75 (!) 35  micronodules are seen in the right middle lobe there is a 6 mm pulmonary nodule in the anterolateral right middle lobe. There is bronchial wall thickening in the right lower lobe with an area of partial consolidation. There is an area of consolidation in the posterior left lower lobe. UPPER ABDOMEN: There is a 7.2 cm cyst in the superior pole of the right kidney. There is a 2.5 cm cyst in the posterior left kidney. BONES: No aggressive bone lesion or fracture. There is chronic formae of several right-sided ribs.     1. Negative for pulmonary embolus. 2. Significant airspace disease in the right lung likely due to infection. 3. Area of consolidation in the posterior left lower lobe likely related to infection as well. 4. Bilateral hilar angel enlargement. Several enlarged lymph nodes in the mediastinum. These are likely reactive.      _______________________________________________________________________    TOTAL TIME:  76 Minutes    Critical Care Provided     Minutes non procedure based    Signed: Joselo Lopez MD    Procedures: see electronic medical records for all procedures/Xrays and details which were not copied into this note but were reviewed prior to creation of Plan.

## 2024-04-03 NOTE — PROGRESS NOTES
End of Shift Note    Bedside shift change report given to MURRAY Saini (oncoming nurse) by Saumya Foy RN (offgoing nurse).  Report included the following information SBAR, Kardex, Intake/Output, and MAR    Shift worked:  [id number]     Shift summary and any significant changes:     Patient tolerated care fairly well, no complaints of pain.  All scheduled meds, pt education, and frequent rounding provided. Pt ambulates with a steady gait to the bathroom, sat in the recliner in the afternoon.  Echo completed.      Concerns for physician to address:       Zone phone for oncoming shift:            Saumya Foy RN

## 2024-04-04 LAB
ANION GAP SERPL CALC-SCNC: 3 MMOL/L (ref 5–15)
BASOPHILS # BLD: 0.1 K/UL (ref 0–0.1)
BASOPHILS NFR BLD: 1 % (ref 0–1)
BUN SERPL-MCNC: 14 MG/DL (ref 6–20)
BUN/CREAT SERPL: 18 (ref 12–20)
CALCIUM SERPL-MCNC: 9.3 MG/DL (ref 8.5–10.1)
CHLORIDE SERPL-SCNC: 102 MMOL/L (ref 97–108)
CO2 SERPL-SCNC: 30 MMOL/L (ref 21–32)
CREAT SERPL-MCNC: 0.76 MG/DL (ref 0.7–1.3)
DIFFERENTIAL METHOD BLD: ABNORMAL
EOSINOPHIL # BLD: 0.2 K/UL (ref 0–0.4)
EOSINOPHIL NFR BLD: 3 % (ref 0–7)
ERYTHROCYTE [DISTWIDTH] IN BLOOD BY AUTOMATED COUNT: 14.6 % (ref 11.5–14.5)
EST. AVERAGE GLUCOSE BLD GHB EST-MCNC: 260 MG/DL
GLUCOSE BLD STRIP.AUTO-MCNC: 155 MG/DL (ref 65–117)
GLUCOSE BLD STRIP.AUTO-MCNC: 294 MG/DL (ref 65–117)
GLUCOSE BLD STRIP.AUTO-MCNC: 324 MG/DL (ref 65–117)
GLUCOSE SERPL-MCNC: 152 MG/DL (ref 65–100)
HBA1C MFR BLD: 10.7 % (ref 4–5.6)
HCT VFR BLD AUTO: 39.7 % (ref 36.6–50.3)
HGB BLD-MCNC: 12.6 G/DL (ref 12.1–17)
IMM GRANULOCYTES # BLD AUTO: 0.1 K/UL (ref 0–0.04)
IMM GRANULOCYTES NFR BLD AUTO: 2 % (ref 0–0.5)
LYMPHOCYTES # BLD: 2.1 K/UL (ref 0.8–3.5)
LYMPHOCYTES NFR BLD: 25 % (ref 12–49)
MCH RBC QN AUTO: 30.5 PG (ref 26–34)
MCHC RBC AUTO-ENTMCNC: 31.7 G/DL (ref 30–36.5)
MCV RBC AUTO: 96.1 FL (ref 80–99)
MONOCYTES # BLD: 0.7 K/UL (ref 0–1)
MONOCYTES NFR BLD: 9 % (ref 5–13)
NEUTS SEG # BLD: 5.1 K/UL (ref 1.8–8)
NEUTS SEG NFR BLD: 60 % (ref 32–75)
NRBC # BLD: 0 K/UL (ref 0–0.01)
NRBC BLD-RTO: 0 PER 100 WBC
PLATELET # BLD AUTO: 328 K/UL (ref 150–400)
PMV BLD AUTO: 8.8 FL (ref 8.9–12.9)
POTASSIUM SERPL-SCNC: 3.9 MMOL/L (ref 3.5–5.1)
RBC # BLD AUTO: 4.13 M/UL (ref 4.1–5.7)
SERVICE CMNT-IMP: ABNORMAL
SODIUM SERPL-SCNC: 135 MMOL/L (ref 136–145)
WBC # BLD AUTO: 8.2 K/UL (ref 4.1–11.1)

## 2024-04-04 PROCEDURE — 87205 SMEAR GRAM STAIN: CPT

## 2024-04-04 PROCEDURE — 6360000002 HC RX W HCPCS: Performed by: NURSE PRACTITIONER

## 2024-04-04 PROCEDURE — 2580000003 HC RX 258: Performed by: STUDENT IN AN ORGANIZED HEALTH CARE EDUCATION/TRAINING PROGRAM

## 2024-04-04 PROCEDURE — 6360000002 HC RX W HCPCS: Performed by: STUDENT IN AN ORGANIZED HEALTH CARE EDUCATION/TRAINING PROGRAM

## 2024-04-04 PROCEDURE — 36415 COLL VENOUS BLD VENIPUNCTURE: CPT

## 2024-04-04 PROCEDURE — 94760 N-INVAS EAR/PLS OXIMETRY 1: CPT

## 2024-04-04 PROCEDURE — 85025 COMPLETE CBC W/AUTO DIFF WBC: CPT

## 2024-04-04 PROCEDURE — 6370000000 HC RX 637 (ALT 250 FOR IP): Performed by: NURSE PRACTITIONER

## 2024-04-04 PROCEDURE — 82962 GLUCOSE BLOOD TEST: CPT

## 2024-04-04 PROCEDURE — 6370000000 HC RX 637 (ALT 250 FOR IP): Performed by: STUDENT IN AN ORGANIZED HEALTH CARE EDUCATION/TRAINING PROGRAM

## 2024-04-04 PROCEDURE — 1100000003 HC PRIVATE W/ TELEMETRY

## 2024-04-04 PROCEDURE — 83036 HEMOGLOBIN GLYCOSYLATED A1C: CPT

## 2024-04-04 PROCEDURE — 87070 CULTURE OTHR SPECIMN AEROBIC: CPT

## 2024-04-04 PROCEDURE — 94640 AIRWAY INHALATION TREATMENT: CPT

## 2024-04-04 PROCEDURE — 80048 BASIC METABOLIC PNL TOTAL CA: CPT

## 2024-04-04 PROCEDURE — 2700000000 HC OXYGEN THERAPY PER DAY

## 2024-04-04 RX ORDER — INSULIN GLARGINE 100 [IU]/ML
10 INJECTION, SOLUTION SUBCUTANEOUS NIGHTLY
Status: DISCONTINUED | OUTPATIENT
Start: 2024-04-04 | End: 2024-04-04

## 2024-04-04 RX ORDER — INSULIN GLARGINE 100 [IU]/ML
8 INJECTION, SOLUTION SUBCUTANEOUS NIGHTLY
Status: DISCONTINUED | OUTPATIENT
Start: 2024-04-04 | End: 2024-04-05

## 2024-04-04 RX ORDER — GLIPIZIDE 5 MG/1
10 TABLET ORAL
Status: DISCONTINUED | OUTPATIENT
Start: 2024-04-04 | End: 2024-04-07

## 2024-04-04 RX ORDER — IPRATROPIUM BROMIDE AND ALBUTEROL SULFATE 2.5; .5 MG/3ML; MG/3ML
1 SOLUTION RESPIRATORY (INHALATION)
Status: DISCONTINUED | OUTPATIENT
Start: 2024-04-04 | End: 2024-04-06

## 2024-04-04 RX ADMIN — AMLODIPINE BESYLATE 5 MG: 5 TABLET ORAL at 09:28

## 2024-04-04 RX ADMIN — ENOXAPARIN SODIUM 40 MG: 100 INJECTION SUBCUTANEOUS at 09:28

## 2024-04-04 RX ADMIN — SODIUM CHLORIDE, PRESERVATIVE FREE 10 ML: 5 INJECTION INTRAVENOUS at 09:29

## 2024-04-04 RX ADMIN — PIPERACILLIN AND TAZOBACTAM 3375 MG: 3; .375 INJECTION, POWDER, LYOPHILIZED, FOR SOLUTION INTRAVENOUS at 09:25

## 2024-04-04 RX ADMIN — ARFORMOTEROL TARTRATE: 15 SOLUTION RESPIRATORY (INHALATION) at 08:03

## 2024-04-04 RX ADMIN — AZITHROMYCIN 250 MG: 250 TABLET, FILM COATED ORAL at 09:27

## 2024-04-04 RX ADMIN — VANCOMYCIN HYDROCHLORIDE 1000 MG: 1 INJECTION, POWDER, LYOPHILIZED, FOR SOLUTION INTRAVENOUS at 05:43

## 2024-04-04 RX ADMIN — HYDROCHLOROTHIAZIDE 25 MG: 25 TABLET ORAL at 09:28

## 2024-04-04 RX ADMIN — SODIUM CHLORIDE: 9 INJECTION, SOLUTION INTRAVENOUS at 17:31

## 2024-04-04 RX ADMIN — INSULIN LISPRO 6 UNITS: 100 INJECTION, SOLUTION INTRAVENOUS; SUBCUTANEOUS at 16:29

## 2024-04-04 RX ADMIN — GUAIFENESIN 600 MG: 600 TABLET, EXTENDED RELEASE ORAL at 21:29

## 2024-04-04 RX ADMIN — INSULIN GLARGINE 8 UNITS: 100 INJECTION, SOLUTION SUBCUTANEOUS at 21:30

## 2024-04-04 RX ADMIN — SODIUM CHLORIDE, PRESERVATIVE FREE 10 ML: 5 INJECTION INTRAVENOUS at 21:29

## 2024-04-04 RX ADMIN — GUAIFENESIN 600 MG: 600 TABLET, EXTENDED RELEASE ORAL at 09:28

## 2024-04-04 RX ADMIN — INSULIN LISPRO 4 UNITS: 100 INJECTION, SOLUTION INTRAVENOUS; SUBCUTANEOUS at 11:54

## 2024-04-04 RX ADMIN — ROSUVASTATIN CALCIUM 40 MG: 40 TABLET, FILM COATED ORAL at 09:27

## 2024-04-04 RX ADMIN — PIPERACILLIN AND TAZOBACTAM 3375 MG: 3; .375 INJECTION, POWDER, LYOPHILIZED, FOR SOLUTION INTRAVENOUS at 17:32

## 2024-04-04 RX ADMIN — GLIPIZIDE 10 MG: 5 TABLET ORAL at 16:28

## 2024-04-04 RX ADMIN — VANCOMYCIN HYDROCHLORIDE 1000 MG: 1 INJECTION, POWDER, LYOPHILIZED, FOR SOLUTION INTRAVENOUS at 15:05

## 2024-04-04 RX ADMIN — EMPAGLIFLOZIN 25 MG: 25 TABLET, FILM COATED ORAL at 09:28

## 2024-04-04 RX ADMIN — METOPROLOL SUCCINATE 100 MG: 50 TABLET, EXTENDED RELEASE ORAL at 09:27

## 2024-04-04 ASSESSMENT — PAIN SCALES - GENERAL
PAINLEVEL_OUTOF10: 0
PAINLEVEL_OUTOF10: 0

## 2024-04-04 NOTE — PROGRESS NOTES
Pharmacy Antimicrobial Kinetic Dosing    Indication for Antimicrobials: CAP     Current Regimen of Each Antimicrobial:  Vancomycin - pharmacy dosing; Start Date /; Day # 2  Zosyn 4.5 g IV once then 3.375 g IV Q8H; Start Date 4/2; Day # 2  Azithromycin 500 mg then 250 mg PO daily; Start Date 4/2; Day # 2    Previous Antimicrobial Therapy:  Doxy/ceftriaxone x 1     Goal Level: Vancomycin -600    Date Dose & Interval Measured (mcg/mL) Predicted AUC                       Significant Cultures:   MRSA swab pending    Labs:  Recent Labs     Units 24  0429 24  0404 24  1716   CREATININE MG/DL 0.76 0.90 0.92   BUN MG/DL 14 22* 21*   PROCAL ng/mL  --  0.05  --    WBC K/uL 8.2  --  11.5*   BANDS %  --   --  3     Temp (24hrs), Av.5 °F (36.9 °C), Min:98.1 °F (36.7 °C), Max:99 °F (37.2 °C)      Conditions for Dosing Consideration: None    Creatinine Clearance (mL/min): Estimated Creatinine Clearance: 97 mL/min (based on SCr of 0.76 mg/dL).       Impression/Plan:   Vancomycin 2000 mg IV once then 1000 mg IV Q12H, expected .  Zosyn dosing okay  MRSA Nares/procalcitonin ordered  Random level this evening 0000 prior to 0100 dose   Antimicrobial stop date 7 days     Pharmacy will follow daily and adjust medications as appropriate for renal function and/or serum levels.    Thank you,  Diamante Stanford [facility]

## 2024-04-04 NOTE — CONSULTS
Pulmonary, Critical Care, and Sleep Medicine~Consult Note    Name: Irwin Pineda MRN: [id number]   : 1950 Hospital: [facility]   Date: 2024 4:15 PM Admission: 2024     Impression Plan   Acute hypoxemic respiratory failure  Pneumonia  bronchospasm  HTN  CAD  Prostate CA s/p resection  DM  H/o DVT  Former smoker Supp O2 prn goal sats >90%  Agree w/ current empiric abx  Sputum cx if able  Add scheduled duonebs  Laba/ics nebs  Cont mucinex  Dvt ppx: lovenox  Recommend repeat chest CT in 6-8 weeks    Thank you for the consult, will follow     Daily Progression:    Consult for pna    HPI: 72 y/o M with PMH CAD, prostate CA s/p resection, DM, HTN, DVT s/p treatment w/ AC who presented to the ED c/o SOB.  In the ER, patient was hypoxic to 87% on RA at rest and 82% on room air on ambulation.     Labs: wbc 11.5, bnp 328, procal 0.05    CTA chest 24:  Negative for pulmonary embolus.  Significant airspace disease in the right lung likely due to infection.  Area of consolidation in the posterior left lower lobe likely related to  infection as well.  Bilateral hilar angel enlargement. Several enlarged lymph nodes in the  mediastinum. These are likely reactive  --no prior chest CT for comparison.    Follows with Dr. Tracy. Last seen 2023 when a cxr was ordered to follow up on bibasilar atx on CXR; it does not appear this was ever done. History of PNA.    ROS: SOB is improving. Cough is a little better; he does have a chronic cough d/t chronic rhinitis (sees ENT for this). Cough productive of small amounts of yellow sputum. He denies wheeze. Neb tx are helpful.    Social hx: quit smoking 30 years ago    I have reviewed the labs and previous day’s notes.    Pertinent items are noted in HPI.  No past medical history on file.   No past surgical history on file.   Prior to Admission medications    Medication Sig Start Date End Date Taking? Authorizing

## 2024-04-04 NOTE — PROGRESS NOTES
Hospitalist Progress Note    NAME:   Irwin Pineda   : 1950   MRN: [id number]     Date/Time: 2024 2:18 PM  Patient PCP: Eric Garza MD    Estimated discharge date: greater than 24 hours  Barriers: Clinical stability. CM to follow for discharge planning. No home needs identified at present.      Assessment / Plan:  Acute hypoxemic respiratory failure        Right upper lobe CAP  - procalcitonin 0.05  - COVID in and influenza negative  - MRSA culture ordered  - CTA of chest shows:  Negative for pulmonary embolus.  2. Significant airspace disease in the right lung likely due to infection.  3. Area of consolidation in the posterior left lower lobe likely related to infection as well.  4. Bilateral hilar angel enlargement. Several enlarged lymph nodes in the mediastinum. These are likely reactive  - keep 02 sats greater than 92%  - incentive spirometry  - turn, cough and deep breathe  - prn and scheduled nebulizer treatments  - antitussive and mucolytic  - continue vancomycin, zosyn, and azithromycin  - oxygen challenge prior to discharge  - pulmonary following    2. Essential HTN      History of DVT      CAD  - continue norvasc, HCTZ, bb, statin    3. Type II DM  - hemoglobin A1c 10.7   - hold metformin  - resume pharmacy substitution for home amaryl (glipizide)  - continue jardiance  - sliding scale insulin  - add lantus nightly  - accuchecks  - diabetic diet  - hypoglycemia protocol  - diabetes management to follow    4. Prostate cancer      Status post resection  - follow up outpatient with cancer physician as outpatient.        Medical Decision Making:   I personally reviewed labs: yes  I personally reviewed imaging: yes  I personally reviewed EKG: yes  Toxic drug monitoring: Lovenox, H/H  Discussed case with: patient, nursing, CM, Dr. Dennis        Code Status: Full Code  DVT Prophylaxis:  Lovenox    Subjective:     Chief Complaint / Reason for Physician Visit  \" I feel a little

## 2024-04-05 ENCOUNTER — APPOINTMENT (OUTPATIENT)
Facility: HOSPITAL | Age: 74
DRG: 193 | End: 2024-04-05
Payer: MEDICARE

## 2024-04-05 PROBLEM — Z79.4 TYPE 2 DIABETES MELLITUS WITH HYPERGLYCEMIA, WITH LONG-TERM CURRENT USE OF INSULIN (HCC): Status: ACTIVE | Noted: 2024-04-05

## 2024-04-05 PROBLEM — R73.09 HEMOGLOBIN A1C GREATER THAN 9.0%: Status: ACTIVE | Noted: 2024-04-05

## 2024-04-05 PROBLEM — E11.65 TYPE 2 DIABETES MELLITUS WITH HYPERGLYCEMIA, WITH LONG-TERM CURRENT USE OF INSULIN (HCC): Status: ACTIVE | Noted: 2024-04-05

## 2024-04-05 LAB
ANION GAP SERPL CALC-SCNC: 4 MMOL/L (ref 5–15)
BACTERIA SPEC CULT: NORMAL
BACTERIA SPEC CULT: NORMAL
BASOPHILS # BLD: 0.1 K/UL (ref 0–0.1)
BASOPHILS NFR BLD: 1 % (ref 0–1)
BUN SERPL-MCNC: 21 MG/DL (ref 6–20)
BUN/CREAT SERPL: 20 (ref 12–20)
CALCIUM SERPL-MCNC: 9.2 MG/DL (ref 8.5–10.1)
CHLORIDE SERPL-SCNC: 101 MMOL/L (ref 97–108)
CO2 SERPL-SCNC: 31 MMOL/L (ref 21–32)
CREAT SERPL-MCNC: 1.04 MG/DL (ref 0.7–1.3)
DIFFERENTIAL METHOD BLD: ABNORMAL
EOSINOPHIL # BLD: 0.2 K/UL (ref 0–0.4)
EOSINOPHIL NFR BLD: 2 % (ref 0–7)
ERYTHROCYTE [DISTWIDTH] IN BLOOD BY AUTOMATED COUNT: 14.6 % (ref 11.5–14.5)
GLUCOSE BLD STRIP.AUTO-MCNC: 153 MG/DL (ref 65–117)
GLUCOSE BLD STRIP.AUTO-MCNC: 260 MG/DL (ref 65–117)
GLUCOSE BLD STRIP.AUTO-MCNC: 290 MG/DL (ref 65–117)
GLUCOSE BLD STRIP.AUTO-MCNC: 295 MG/DL (ref 65–117)
GLUCOSE SERPL-MCNC: 199 MG/DL (ref 65–100)
HCT VFR BLD AUTO: 42.6 % (ref 36.6–50.3)
HGB BLD-MCNC: 13.3 G/DL (ref 12.1–17)
IMM GRANULOCYTES # BLD AUTO: 0.1 K/UL (ref 0–0.04)
IMM GRANULOCYTES NFR BLD AUTO: 1 % (ref 0–0.5)
LYMPHOCYTES # BLD: 2.5 K/UL (ref 0.8–3.5)
LYMPHOCYTES NFR BLD: 26 % (ref 12–49)
MCH RBC QN AUTO: 29.7 PG (ref 26–34)
MCHC RBC AUTO-ENTMCNC: 31.2 G/DL (ref 30–36.5)
MCV RBC AUTO: 95.1 FL (ref 80–99)
MONOCYTES # BLD: 0.7 K/UL (ref 0–1)
MONOCYTES NFR BLD: 8 % (ref 5–13)
NEUTS SEG # BLD: 5.7 K/UL (ref 1.8–8)
NEUTS SEG NFR BLD: 61 % (ref 32–75)
NRBC # BLD: 0 K/UL (ref 0–0.01)
NRBC BLD-RTO: 0 PER 100 WBC
PLATELET # BLD AUTO: 404 K/UL (ref 150–400)
PMV BLD AUTO: 8.5 FL (ref 8.9–12.9)
POTASSIUM SERPL-SCNC: 3.7 MMOL/L (ref 3.5–5.1)
RBC # BLD AUTO: 4.48 M/UL (ref 4.1–5.7)
SERVICE CMNT-IMP: ABNORMAL
SERVICE CMNT-IMP: NORMAL
SODIUM SERPL-SCNC: 136 MMOL/L (ref 136–145)
VANCOMYCIN SERPL-MCNC: 14 UG/ML
WBC # BLD AUTO: 9.4 K/UL (ref 4.1–11.1)

## 2024-04-05 PROCEDURE — 1100000003 HC PRIVATE W/ TELEMETRY

## 2024-04-05 PROCEDURE — 80048 BASIC METABOLIC PNL TOTAL CA: CPT

## 2024-04-05 PROCEDURE — 6360000002 HC RX W HCPCS: Performed by: STUDENT IN AN ORGANIZED HEALTH CARE EDUCATION/TRAINING PROGRAM

## 2024-04-05 PROCEDURE — 2700000000 HC OXYGEN THERAPY PER DAY

## 2024-04-05 PROCEDURE — 6360000002 HC RX W HCPCS: Performed by: NURSE PRACTITIONER

## 2024-04-05 PROCEDURE — 71046 X-RAY EXAM CHEST 2 VIEWS: CPT

## 2024-04-05 PROCEDURE — 6370000000 HC RX 637 (ALT 250 FOR IP): Performed by: PHYSICIAN ASSISTANT

## 2024-04-05 PROCEDURE — 99222 1ST HOSP IP/OBS MODERATE 55: CPT | Performed by: CLINICAL NURSE SPECIALIST

## 2024-04-05 PROCEDURE — 6370000000 HC RX 637 (ALT 250 FOR IP): Performed by: STUDENT IN AN ORGANIZED HEALTH CARE EDUCATION/TRAINING PROGRAM

## 2024-04-05 PROCEDURE — 80202 ASSAY OF VANCOMYCIN: CPT

## 2024-04-05 PROCEDURE — 2580000003 HC RX 258: Performed by: STUDENT IN AN ORGANIZED HEALTH CARE EDUCATION/TRAINING PROGRAM

## 2024-04-05 PROCEDURE — 94761 N-INVAS EAR/PLS OXIMETRY MLT: CPT

## 2024-04-05 PROCEDURE — 85025 COMPLETE CBC W/AUTO DIFF WBC: CPT

## 2024-04-05 PROCEDURE — 82962 GLUCOSE BLOOD TEST: CPT

## 2024-04-05 PROCEDURE — 94640 AIRWAY INHALATION TREATMENT: CPT

## 2024-04-05 PROCEDURE — 6370000000 HC RX 637 (ALT 250 FOR IP): Performed by: CLINICAL NURSE SPECIALIST

## 2024-04-05 PROCEDURE — 6370000000 HC RX 637 (ALT 250 FOR IP): Performed by: NURSE PRACTITIONER

## 2024-04-05 PROCEDURE — 36415 COLL VENOUS BLD VENIPUNCTURE: CPT

## 2024-04-05 RX ORDER — INSULIN GLARGINE 100 [IU]/ML
26 INJECTION, SOLUTION SUBCUTANEOUS NIGHTLY
Status: DISCONTINUED | OUTPATIENT
Start: 2024-04-05 | End: 2024-04-08

## 2024-04-05 RX ORDER — INSULIN GLARGINE 100 [IU]/ML
10 INJECTION, SOLUTION SUBCUTANEOUS NIGHTLY
Status: DISCONTINUED | OUTPATIENT
Start: 2024-04-05 | End: 2024-04-05

## 2024-04-05 RX ADMIN — GUAIFENESIN 600 MG: 600 TABLET, EXTENDED RELEASE ORAL at 08:41

## 2024-04-05 RX ADMIN — INSULIN GLARGINE 26 UNITS: 100 INJECTION, SOLUTION SUBCUTANEOUS at 20:54

## 2024-04-05 RX ADMIN — AMLODIPINE BESYLATE 5 MG: 5 TABLET ORAL at 08:41

## 2024-04-05 RX ADMIN — PIPERACILLIN AND TAZOBACTAM 3375 MG: 3; .375 INJECTION, POWDER, LYOPHILIZED, FOR SOLUTION INTRAVENOUS at 01:21

## 2024-04-05 RX ADMIN — PIPERACILLIN AND TAZOBACTAM 3375 MG: 3; .375 INJECTION, POWDER, LYOPHILIZED, FOR SOLUTION INTRAVENOUS at 08:46

## 2024-04-05 RX ADMIN — AZITHROMYCIN 250 MG: 250 TABLET, FILM COATED ORAL at 08:41

## 2024-04-05 RX ADMIN — HYDROCHLOROTHIAZIDE 25 MG: 25 TABLET ORAL at 08:41

## 2024-04-05 RX ADMIN — PIPERACILLIN AND TAZOBACTAM 3375 MG: 3; .375 INJECTION, POWDER, LYOPHILIZED, FOR SOLUTION INTRAVENOUS at 17:41

## 2024-04-05 RX ADMIN — INSULIN LISPRO 4 UNITS: 100 INJECTION, SOLUTION INTRAVENOUS; SUBCUTANEOUS at 17:30

## 2024-04-05 RX ADMIN — ENOXAPARIN SODIUM 40 MG: 100 INJECTION SUBCUTANEOUS at 08:40

## 2024-04-05 RX ADMIN — METFORMIN HYDROCHLORIDE 1000 MG: 500 TABLET ORAL at 17:31

## 2024-04-05 RX ADMIN — SODIUM CHLORIDE, PRESERVATIVE FREE 10 ML: 5 INJECTION INTRAVENOUS at 08:42

## 2024-04-05 RX ADMIN — INSULIN LISPRO 4 UNITS: 100 INJECTION, SOLUTION INTRAVENOUS; SUBCUTANEOUS at 12:07

## 2024-04-05 RX ADMIN — VANCOMYCIN HYDROCHLORIDE 1000 MG: 1 INJECTION, POWDER, LYOPHILIZED, FOR SOLUTION INTRAVENOUS at 13:07

## 2024-04-05 RX ADMIN — ARFORMOTEROL TARTRATE: 15 SOLUTION RESPIRATORY (INHALATION) at 07:58

## 2024-04-05 RX ADMIN — METOPROLOL SUCCINATE 100 MG: 50 TABLET, EXTENDED RELEASE ORAL at 08:41

## 2024-04-05 RX ADMIN — ROSUVASTATIN CALCIUM 40 MG: 40 TABLET, FILM COATED ORAL at 08:41

## 2024-04-05 RX ADMIN — GLIPIZIDE 10 MG: 5 TABLET ORAL at 17:31

## 2024-04-05 RX ADMIN — VANCOMYCIN HYDROCHLORIDE 1000 MG: 1 INJECTION, POWDER, LYOPHILIZED, FOR SOLUTION INTRAVENOUS at 01:26

## 2024-04-05 RX ADMIN — ARFORMOTEROL TARTRATE: 15 SOLUTION RESPIRATORY (INHALATION) at 20:14

## 2024-04-05 RX ADMIN — IPRATROPIUM BROMIDE AND ALBUTEROL SULFATE 1 DOSE: .5; 3 SOLUTION RESPIRATORY (INHALATION) at 15:27

## 2024-04-05 RX ADMIN — IPRATROPIUM BROMIDE AND ALBUTEROL SULFATE 1 DOSE: .5; 3 SOLUTION RESPIRATORY (INHALATION) at 07:58

## 2024-04-05 RX ADMIN — IPRATROPIUM BROMIDE AND ALBUTEROL SULFATE 1 DOSE: .5; 3 SOLUTION RESPIRATORY (INHALATION) at 20:14

## 2024-04-05 RX ADMIN — EMPAGLIFLOZIN 25 MG: 25 TABLET, FILM COATED ORAL at 08:41

## 2024-04-05 RX ADMIN — IPRATROPIUM BROMIDE AND ALBUTEROL SULFATE 1 DOSE: .5; 3 SOLUTION RESPIRATORY (INHALATION) at 11:55

## 2024-04-05 RX ADMIN — SODIUM CHLORIDE, PRESERVATIVE FREE 10 ML: 5 INJECTION INTRAVENOUS at 20:15

## 2024-04-05 RX ADMIN — GLIPIZIDE 10 MG: 5 TABLET ORAL at 06:06

## 2024-04-05 RX ADMIN — GUAIFENESIN 600 MG: 600 TABLET, EXTENDED RELEASE ORAL at 20:14

## 2024-04-05 ASSESSMENT — PAIN SCALES - GENERAL: PAINLEVEL_OUTOF10: 0

## 2024-04-05 NOTE — PROGRESS NOTES
PCP hospital follow-up transitional care appointment has been scheduled with Dr. Eric Garza on 4/9/24 1100. Pending patient discharge.  Patricia Gordon, Care Management Assistant

## 2024-04-05 NOTE — PROGRESS NOTES
Attempted to see patient however he was off the floor for testing.  Will check back Monday if he remains inpatient.

## 2024-04-05 NOTE — PROGRESS NOTES
Pharmacy Antimicrobial Kinetic Dosing    Indication for Antimicrobials: CAP     Current Regimen of Each Antimicrobial:  Vancomycin - pharmacy dosing; Start Date /2; Day # 4  Zosyn 4.5 g IV once then 3.375 g IV Q8H; Start Date 4/2; Day # 4  Azithromycin 500 mg then 250 mg PO daily; Start Date 4/2; Day # 4    Previous Antimicrobial Therapy:  Doxy/ceftriaxone x 1     Goal Level: Vancomycin -600    Date Dose & Interval Measured (mcg/mL) Predicted AUC   4/5 1000 mg IV Q12H 14 543                 Significant Cultures:   MRSA swab pending still    Labs:  Recent Labs     Units 24  0003 24  0429 24  0404 24  1716   CREATININE MG/DL 1.04 0.76 0.90 0.92   BUN MG/DL 21* 14 22* 21*   PROCAL ng/mL  --   --  0.05  --    WBC K/uL 9.4 8.2  --  11.5*   BANDS %  --   --   --  3     Temp (24hrs), Av.2 °F (36.8 °C), Min:98.1 °F (36.7 °C), Max:98.2 °F (36.8 °C)    Conditions for Dosing Consideration: None    Creatinine Clearance (mL/min): Estimated Creatinine Clearance: 71 mL/min (based on SCr of 1.04 mg/dL).     Impression/Plan:   Vancomycin 2000 mg IV once then 1000 mg IV Q12H, resulted in   Zosyn dosing okay  MRSA Nares pending, procal 0.05  Antimicrobial stop date 7 days     Pharmacy will follow daily and adjust medications as appropriate for renal function and/or serum levels.    Thank you,  Armando Oliveira, [facility]

## 2024-04-05 NOTE — CARE COORDINATION
Transition of Care Plan:    RUR: 9%  Prior Level of Functioning: Independent   Disposition: Home with son and no needs   If SNF or IPR: Date FOC offered:   Date FOC received:   Accepting facility:   Date authorization started with reference number:   Date authorization received and expires:   Follow up appointments: PCP  DME needed: No needs   Transportation at discharge: Pt's family   IM/IMM Medicare/ letter given: 4/5/24  Is patient a [place] and connected with [facility]? No   If yes, was [place] transfer form completed and [facility] notified? No  Caregiver Contact: Pt's son   Discharge Caregiver contacted prior to discharge? Pt will contacted   Care Conference needed? No  Barriers to discharge:  Medical clearance      CM reviewed pt's chart and pt is not medically stable due to waiting on pul clearance and clinical stability.    CM was informed that pt does not need home oxygen at the time of d/c.    CM will follow and assist with d/c planning.    Awilda Junior

## 2024-04-05 NOTE — PROGRESS NOTES
Hospitalist Progress Note    NAME:   Irwin Pineda   : 1950   MRN: [id number]     Date/Time: 2024 4:37 PM  Patient PCP: Eric Garza MD    Estimated discharge date: greater than 24 hours  Barriers: Clinical stability. Awaiting MRSA culture results. Weaning oxygen. CM to follow for discharge planning.     Assessment / Plan:  Acute hypoxemic respiratory failure        Right upper lobe CAP  - procalcitonin 0.05  - COVID in and influenza negative  - MRSA culture pending  - preliminary respiratory culture shows light normal daphnie  - repeat chest xray on 24 shows:  Right upper lobe airspace disease suspicious for pneumonia  Improved aeration in the left lung base  - CTA of chest shows:  Negative for pulmonary embolus.  2. Significant airspace disease in the right lung likely due to infection.  3. Area of consolidation in the posterior left lower lobe likely related to infection as well.  4. Bilateral hilar angel enlargement. Several enlarged lymph nodes in the mediastinum. These are likely reactive  - keep 02 sats greater than 92%  - incentive spirometry  - turn, cough and deep breathe  - prn and scheduled nebulizer treatments  - antitussive and mucolytic  - continue vancomycin, zosyn, and azithromycin  - oxygen challenge prior to discharge  - pulmonary following    2. Essential HTN      History of DVT      CAD  - pro   - continue norvasc, HCTZ, bb, statin     3. Type II DM  - hemoglobin A1c 10.7   - continue sliding scale insulin  - continue pharmacy substitution for home amaryl (glipizide)  - continue jardiance  - safe to resume metformin as patient outside of contrast window  - lantus increased to 26 units daily  - accuchecks  - diabetic diet  - hypoglycemia protocol  - diabetes management following     4. Prostate cancer      Status post resection  - follow up outpatient with [facility]    Medical Decision Making:   I personally reviewed labs: yes  I personally  bilaterally, congested cough, no wheezing or rales.  No accessory muscle use  CV:                  Regular  rhythm,  No edema. Telemetry: sinus rhythm.  GI:                   Soft, Non distended, Non tender.  +Bowel sounds. Last bowel movement on 4/3/24  Neurologic:       Alert and oriented X 3, normal speech,   Psych:   Good insight. Not anxious nor agitated  Skin:                No rashes.  No jaundice    Reviewed most current lab test results and cultures  YES  Reviewed most current radiology test results   YES  Review and summation of old records today    NO  Reviewed patient's current orders and MAR    YES  PMH/SH reviewed - no change compared to H&P    Procedures: see electronic medical records for all procedures/Xrays and details which were not copied into this note but were reviewed prior to creation of Plan.      LABS:  I reviewed today's most current labs and imaging studies.  Pertinent labs include:  Recent Labs     04/02/24 1716 04/04/24 0429 04/05/24  0003   WBC 11.5* 8.2 9.4   HGB 12.4 12.6 13.3   HCT 39.6 39.7 42.6    328 404*     Recent Labs     04/02/24 1716 04/03/24  0404 04/04/24 0429 04/05/24  0003   * 136 135* 136   K 3.9 3.6 3.9 3.7    102 102 101   CO2 29 30 30 31   GLUCOSE 127* 139* 152* 199*   BUN 21* 22* 14 21*   CREATININE 0.92 0.90 0.76 1.04   CALCIUM 8.6 9.4 9.3 9.2   LABALBU 2.4*  --   --   --    BILITOT 0.3  --   --   --    AST 11*  --   --   --    ALT 17  --   --   --        Signed: ORLY Simon - NP    30 minutes

## 2024-04-05 NOTE — PROGRESS NOTES
End of Shift Note    Bedside shift change report given to MURRAY Taylor (oncoming nurse) by Saumya Foy RN (offgoing nurse).  Report included the following information SBAR, Kardex, Intake/Output, and MAR    Shift worked:  [id number]     Shift summary and any significant changes:     Patient tolerated care fairly well, no complaints of pain. All scheduled meds, pt education, and frequent rounding provided. Pt ambulates with a steady gait to the bathroom, sat in the recliner in the afternoon.  Removed O2 (nasal canula O2 @ 3L) while pt sitting in the recliner, sating at 92% on RA.  Family and friends present in the afternoon.      Concerns for physician to address:       Zone phone for oncoming shift:            Saumya Foy RN

## 2024-04-05 NOTE — PLAN OF CARE
Upon assessment pt satting 85% on RA, O2 increase to 5L to get to 91%, wean down to 4L overnight with 90-91% sats. Noted tele monitor off, pt reports provider dc it yesterday, orders still in. Patient A&O X4, VSS, 4L/NC. Will continue to monitor.     Problem: Discharge Planning  Goal: Discharge to home or other facility with appropriate resources  Outcome: Progressing  Flowsheets (Taken 4/4/2024 2100)  Discharge to home or other facility with appropriate resources: Identify barriers to discharge with patient and caregiver     Problem: Pain  Goal: Verbalizes/displays adequate comfort level or baseline comfort level  Outcome: Progressing  Flowsheets  Taken 4/5/2024 0400  Verbalizes/displays adequate comfort level or baseline comfort level: Encourage patient to monitor pain and request assistance  Taken 4/4/2024 2100  Verbalizes/displays adequate comfort level or baseline comfort level: Encourage patient to monitor pain and request assistance     Problem: Safety - Adult  Goal: Free from fall injury  Outcome: Progressing     Problem: Nutrition Deficit:  Goal: Optimize nutritional status  Outcome: Progressing     Problem: Respiratory - Adult  Goal: Achieves optimal ventilation and oxygenation  Outcome: Progressing  Flowsheets (Taken 4/4/2024 2100)  Achieves optimal ventilation and oxygenation: Assess for changes in respiratory status

## 2024-04-05 NOTE — PROGRESS NOTES
Pulse oximetry assessment   93% at rest on room air (if 88% or less, skip next steps)  90% while ambulating on room air

## 2024-04-05 NOTE — PROGRESS NOTES
End of Shift Note    Bedside shift change report given to Sherif GAO  (oncoming nurse) by Eric Bunch RN (offgoing nurse).  Report included the following information SBAR, Kardex, Intake/Output, and MAR    Shift worked:  7 am 7 pm      Shift summary and any significant changes:     Patient is alert oriented times 4, o2 at 4liters, independent. Given 3 antibioitcs, o2 testing done and o2 support is hold patient saturation at room air is 93 % . Keep watch for any desaturation. Patient blood sugar needs insulin coverage and given teachings how to administer insulin. Patient will need additional teaching to master the administration.          Eric Bunch RN

## 2024-04-05 NOTE — DIABETES MGMT
[facility]  PROGRAM FOR DIABETES HEALTH  DIABETES MANAGEMENT CONSULT    Consulted by  Merlyn Li APRN - NP  for advanced nursing evaluation and care for inpatient blood glucose management.    Evaluation and Action Plan   Irwin Pineda is a 73 year old gentleman, with a history of Type 2 Diabetes, HTN, CAD, prostate cancer s/p resection who is admitted with acute hypoxemic respiratory failure s/t right upper lobe community acquired pneumonia.  The Program for Diabetes Health has been consulted to assist in glycemic management and advanced diabetes management assessment this admission.    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  He tells us that he has lived with Type 2 Diabetes for over 15 years and consistently takes 25mg Jardiance daily, 1000mg metformin BID and Amaryl 4mg BID.  He was not able to afford the high out of pocket cost of Ozempic last year and his insurance is no longer covering Janumet.  He shares that he was the sole caregiver of his wife for a year and a a half until she passed away last summer from liver disease.  He reports that his A1C is chronically in the 9% range, best was 8%, but unfortunately back up to 10.7%.  He shares that he has intentionally lost weight (about 20 lbs) through eating better and being more active when walking around at work.  This admission, he has been started on 10 units glipizide BID, 25mg jardiance daily and 8 units glargine daily. Glucose range has been 152-324.  Due to his elevated BMI and A1C, we will use a weight based insulin strategy to override glucose pattern.    Management Rationale Action Plan   Medication   Basal needs Using 0.3 units/kg/D  Increased Lantus to 26 units daily   Corrective insulin Using medium sensitivity     Additional orders  Continue carb consistent diet (60g CHO/meal)  Continue 10mg glipizide BID. Can increase am dose to 20mg if he continues to have pre-prandial  135* 136   K 3.6 3.9 3.7    102 101   CO2 30 30 31   BUN 22* 14 21*   CREATININE 0.90 0.76 1.04     Lab Results   Component Value Date    ALT 17 2024    AST 11 (L) 2024    ALKPHOS 67 2024    BILITOT 0.3 2024     No results found for: \"TSH\", \"TSHREFLEX\", \"TSHFT4\", \"TSHELE\", \"PHH9DPH\", \"TSHHS\"  Lab Results   Component Value Date    LABA1C 10.7 (H) 2024     Blood glucose pattern      Significant diabetes-related events over the past 24-72 hours  A1C 10.7%  Fasting B  Pre-prandial: 294-324  Basal: 8 units Glargine daily  Bolus: None  Correction: 10 units  Total daily insulin dose in the last 24 hours: 18 units      Assessment and Nursing Intervention   Nursing Diagnosis Risk for unstable blood glucose pattern   Nursing Intervention Domain 5250 Decision-making Support   Nursing Interventions Examined current inpatient diabetes/blood glucose control   Explored factors facilitating and impeding inpatient management  Explored corrective strategies with patient and responsible inpatient provider   Informed patient of rational for insulin strategy while hospitalized     Nursing Diagnosis 31867 Ineffective Health Management   Nursing Intervention Domain 5250 Decision-making Support   Nursing Interventions Identified diabetes self-management practices impeding diabetes control  Discussed diabetes survival skills related to  Healthy Plate eating plan; given handouts  Role of physical activity in improving insulin sensitivity and action. Simulated insulin injection.  Procedure for blood glucose monitoring   Medications plan at discharge     Billing Code(s)   44425    Before making these care recommendations, I personally reviewed the hospitalization record, including notes, laboratory & diagnostic data and current medications, and examined the patient at the bedside (circumstances permitting) before determining care. More than fifty (50) percent of the time was spent in patient counseling

## 2024-04-06 LAB
ANION GAP SERPL CALC-SCNC: 6 MMOL/L (ref 5–15)
B PERT DNA SPEC QL NAA+PROBE: NOT DETECTED
BACTERIA SPEC CULT: NORMAL
BASOPHILS # BLD: 0.1 K/UL (ref 0–0.1)
BASOPHILS NFR BLD: 1 % (ref 0–1)
BORDETELLA PARAPERTUSSIS BY PCR: NOT DETECTED
BUN SERPL-MCNC: 21 MG/DL (ref 6–20)
BUN/CREAT SERPL: 20 (ref 12–20)
C PNEUM DNA SPEC QL NAA+PROBE: NOT DETECTED
CALCIUM SERPL-MCNC: 9.4 MG/DL (ref 8.5–10.1)
CHLORIDE SERPL-SCNC: 103 MMOL/L (ref 97–108)
CO2 SERPL-SCNC: 29 MMOL/L (ref 21–32)
CREAT SERPL-MCNC: 1.03 MG/DL (ref 0.7–1.3)
DIFFERENTIAL METHOD BLD: ABNORMAL
EOSINOPHIL # BLD: 0.3 K/UL (ref 0–0.4)
EOSINOPHIL NFR BLD: 2 % (ref 0–7)
ERYTHROCYTE [DISTWIDTH] IN BLOOD BY AUTOMATED COUNT: 14.6 % (ref 11.5–14.5)
FLUAV SUBTYP SPEC NAA+PROBE: NOT DETECTED
FLUBV RNA SPEC QL NAA+PROBE: NOT DETECTED
GLUCOSE BLD STRIP.AUTO-MCNC: 155 MG/DL (ref 65–117)
GLUCOSE BLD STRIP.AUTO-MCNC: 205 MG/DL (ref 65–117)
GLUCOSE BLD STRIP.AUTO-MCNC: 233 MG/DL (ref 65–117)
GLUCOSE BLD STRIP.AUTO-MCNC: 261 MG/DL (ref 65–117)
GLUCOSE SERPL-MCNC: 184 MG/DL (ref 65–100)
GRAM STN SPEC: NORMAL
HADV DNA SPEC QL NAA+PROBE: NOT DETECTED
HCOV 229E RNA SPEC QL NAA+PROBE: NOT DETECTED
HCOV HKU1 RNA SPEC QL NAA+PROBE: NOT DETECTED
HCOV NL63 RNA SPEC QL NAA+PROBE: NOT DETECTED
HCOV OC43 RNA SPEC QL NAA+PROBE: NOT DETECTED
HCT VFR BLD AUTO: 42.6 % (ref 36.6–50.3)
HGB BLD-MCNC: 13.1 G/DL (ref 12.1–17)
HMPV RNA SPEC QL NAA+PROBE: NOT DETECTED
HPIV1 RNA SPEC QL NAA+PROBE: NOT DETECTED
HPIV2 RNA SPEC QL NAA+PROBE: NOT DETECTED
HPIV3 RNA SPEC QL NAA+PROBE: NOT DETECTED
HPIV4 RNA SPEC QL NAA+PROBE: NOT DETECTED
IMM GRANULOCYTES # BLD AUTO: 0.1 K/UL (ref 0–0.04)
IMM GRANULOCYTES NFR BLD AUTO: 1 % (ref 0–0.5)
LYMPHOCYTES # BLD: 2.7 K/UL (ref 0.8–3.5)
LYMPHOCYTES NFR BLD: 25 % (ref 12–49)
M PNEUMO DNA SPEC QL NAA+PROBE: NOT DETECTED
MCH RBC QN AUTO: 29.6 PG (ref 26–34)
MCHC RBC AUTO-ENTMCNC: 30.8 G/DL (ref 30–36.5)
MCV RBC AUTO: 96.2 FL (ref 80–99)
MONOCYTES # BLD: 0.9 K/UL (ref 0–1)
MONOCYTES NFR BLD: 8 % (ref 5–13)
NEUTS SEG # BLD: 6.7 K/UL (ref 1.8–8)
NEUTS SEG NFR BLD: 63 % (ref 32–75)
NRBC # BLD: 0 K/UL (ref 0–0.01)
NRBC BLD-RTO: 0 PER 100 WBC
PLATELET # BLD AUTO: 347 K/UL (ref 150–400)
PMV BLD AUTO: 8.9 FL (ref 8.9–12.9)
POTASSIUM SERPL-SCNC: 3.8 MMOL/L (ref 3.5–5.1)
RBC # BLD AUTO: 4.43 M/UL (ref 4.1–5.7)
RSV RNA SPEC QL NAA+PROBE: NOT DETECTED
RV+EV RNA SPEC QL NAA+PROBE: NOT DETECTED
SARS-COV-2 RNA RESP QL NAA+PROBE: DETECTED
SERVICE CMNT-IMP: ABNORMAL
SERVICE CMNT-IMP: NORMAL
SODIUM SERPL-SCNC: 138 MMOL/L (ref 136–145)
WBC # BLD AUTO: 10.8 K/UL (ref 4.1–11.1)

## 2024-04-06 PROCEDURE — 6360000002 HC RX W HCPCS: Performed by: STUDENT IN AN ORGANIZED HEALTH CARE EDUCATION/TRAINING PROGRAM

## 2024-04-06 PROCEDURE — 6370000000 HC RX 637 (ALT 250 FOR IP): Performed by: NURSE PRACTITIONER

## 2024-04-06 PROCEDURE — 80048 BASIC METABOLIC PNL TOTAL CA: CPT

## 2024-04-06 PROCEDURE — 2580000003 HC RX 258: Performed by: STUDENT IN AN ORGANIZED HEALTH CARE EDUCATION/TRAINING PROGRAM

## 2024-04-06 PROCEDURE — 6370000000 HC RX 637 (ALT 250 FOR IP): Performed by: CLINICAL NURSE SPECIALIST

## 2024-04-06 PROCEDURE — 1100000003 HC PRIVATE W/ TELEMETRY

## 2024-04-06 PROCEDURE — 94761 N-INVAS EAR/PLS OXIMETRY MLT: CPT

## 2024-04-06 PROCEDURE — 85025 COMPLETE CBC W/AUTO DIFF WBC: CPT

## 2024-04-06 PROCEDURE — 94640 AIRWAY INHALATION TREATMENT: CPT

## 2024-04-06 PROCEDURE — 0202U NFCT DS 22 TRGT SARS-COV-2: CPT

## 2024-04-06 PROCEDURE — 82962 GLUCOSE BLOOD TEST: CPT

## 2024-04-06 PROCEDURE — 6360000002 HC RX W HCPCS: Performed by: NURSE PRACTITIONER

## 2024-04-06 PROCEDURE — 6370000000 HC RX 637 (ALT 250 FOR IP): Performed by: STUDENT IN AN ORGANIZED HEALTH CARE EDUCATION/TRAINING PROGRAM

## 2024-04-06 PROCEDURE — 6370000000 HC RX 637 (ALT 250 FOR IP): Performed by: PHYSICIAN ASSISTANT

## 2024-04-06 PROCEDURE — 2580000003 HC RX 258: Performed by: NURSE PRACTITIONER

## 2024-04-06 PROCEDURE — 36415 COLL VENOUS BLD VENIPUNCTURE: CPT

## 2024-04-06 RX ORDER — AZITHROMYCIN 250 MG/1
250 TABLET, FILM COATED ORAL DAILY
Status: COMPLETED | OUTPATIENT
Start: 2024-04-07 | End: 2024-04-07

## 2024-04-06 RX ORDER — GUAIFENESIN 600 MG/1
1200 TABLET, EXTENDED RELEASE ORAL 2 TIMES DAILY
Status: DISCONTINUED | OUTPATIENT
Start: 2024-04-06 | End: 2024-04-09 | Stop reason: HOSPADM

## 2024-04-06 RX ORDER — IPRATROPIUM BROMIDE AND ALBUTEROL SULFATE 2.5; .5 MG/3ML; MG/3ML
1 SOLUTION RESPIRATORY (INHALATION)
Status: DISCONTINUED | OUTPATIENT
Start: 2024-04-06 | End: 2024-04-08

## 2024-04-06 RX ADMIN — ARFORMOTEROL TARTRATE: 15 SOLUTION RESPIRATORY (INHALATION) at 08:03

## 2024-04-06 RX ADMIN — VANCOMYCIN HYDROCHLORIDE 1000 MG: 1 INJECTION, POWDER, LYOPHILIZED, FOR SOLUTION INTRAVENOUS at 01:04

## 2024-04-06 RX ADMIN — IPRATROPIUM BROMIDE AND ALBUTEROL SULFATE 1 DOSE: .5; 3 SOLUTION RESPIRATORY (INHALATION) at 08:02

## 2024-04-06 RX ADMIN — AZITHROMYCIN 250 MG: 250 TABLET, FILM COATED ORAL at 08:54

## 2024-04-06 RX ADMIN — IPRATROPIUM BROMIDE AND ALBUTEROL SULFATE 1 DOSE: .5; 3 SOLUTION RESPIRATORY (INHALATION) at 19:38

## 2024-04-06 RX ADMIN — SODIUM CHLORIDE, PRESERVATIVE FREE 10 ML: 5 INJECTION INTRAVENOUS at 21:18

## 2024-04-06 RX ADMIN — PIPERACILLIN AND TAZOBACTAM 3375 MG: 3; .375 INJECTION, POWDER, LYOPHILIZED, FOR SOLUTION INTRAVENOUS at 09:01

## 2024-04-06 RX ADMIN — METOPROLOL SUCCINATE 100 MG: 50 TABLET, EXTENDED RELEASE ORAL at 08:54

## 2024-04-06 RX ADMIN — SODIUM CHLORIDE, PRESERVATIVE FREE 10 ML: 5 INJECTION INTRAVENOUS at 09:11

## 2024-04-06 RX ADMIN — HYDROCHLOROTHIAZIDE 25 MG: 25 TABLET ORAL at 08:54

## 2024-04-06 RX ADMIN — INSULIN LISPRO 2 UNITS: 100 INJECTION, SOLUTION INTRAVENOUS; SUBCUTANEOUS at 16:38

## 2024-04-06 RX ADMIN — VANCOMYCIN HYDROCHLORIDE 1000 MG: 1 INJECTION, POWDER, LYOPHILIZED, FOR SOLUTION INTRAVENOUS at 12:09

## 2024-04-06 RX ADMIN — ENOXAPARIN SODIUM 40 MG: 100 INJECTION SUBCUTANEOUS at 08:54

## 2024-04-06 RX ADMIN — GUAIFENESIN 1200 MG: 600 TABLET, EXTENDED RELEASE ORAL at 21:13

## 2024-04-06 RX ADMIN — SODIUM CHLORIDE: 9 INJECTION, SOLUTION INTRAVENOUS at 09:01

## 2024-04-06 RX ADMIN — PIPERACILLIN SODIUM AND TAZOBACTAM SODIUM 3375 MG: 3; .375 INJECTION, POWDER, LYOPHILIZED, FOR SOLUTION INTRAVENOUS at 16:42

## 2024-04-06 RX ADMIN — GUAIFENESIN 600 MG: 600 TABLET, EXTENDED RELEASE ORAL at 08:54

## 2024-04-06 RX ADMIN — SODIUM CHLORIDE: 9 INJECTION, SOLUTION INTRAVENOUS at 16:42

## 2024-04-06 RX ADMIN — IPRATROPIUM BROMIDE AND ALBUTEROL SULFATE 1 DOSE: .5; 3 SOLUTION RESPIRATORY (INHALATION) at 14:50

## 2024-04-06 RX ADMIN — GLIPIZIDE 10 MG: 5 TABLET ORAL at 06:08

## 2024-04-06 RX ADMIN — GLIPIZIDE 10 MG: 5 TABLET ORAL at 16:38

## 2024-04-06 RX ADMIN — METFORMIN HYDROCHLORIDE 1000 MG: 500 TABLET ORAL at 08:54

## 2024-04-06 RX ADMIN — INSULIN GLARGINE 26 UNITS: 100 INJECTION, SOLUTION SUBCUTANEOUS at 21:12

## 2024-04-06 RX ADMIN — ARFORMOTEROL TARTRATE: 15 SOLUTION RESPIRATORY (INHALATION) at 19:38

## 2024-04-06 RX ADMIN — EMPAGLIFLOZIN 25 MG: 25 TABLET, FILM COATED ORAL at 08:54

## 2024-04-06 RX ADMIN — INSULIN LISPRO 2 UNITS: 100 INJECTION, SOLUTION INTRAVENOUS; SUBCUTANEOUS at 12:05

## 2024-04-06 RX ADMIN — ROSUVASTATIN CALCIUM 40 MG: 40 TABLET, FILM COATED ORAL at 08:54

## 2024-04-06 RX ADMIN — PIPERACILLIN AND TAZOBACTAM 3375 MG: 3; .375 INJECTION, POWDER, LYOPHILIZED, FOR SOLUTION INTRAVENOUS at 00:52

## 2024-04-06 RX ADMIN — METFORMIN HYDROCHLORIDE 1000 MG: 500 TABLET ORAL at 16:38

## 2024-04-06 RX ADMIN — AMLODIPINE BESYLATE 5 MG: 5 TABLET ORAL at 08:54

## 2024-04-06 ASSESSMENT — PAIN SCALES - GENERAL: PAINLEVEL_OUTOF10: 0

## 2024-04-06 NOTE — RT PROTOCOL NOTE
ADULT PROTOCOL: JET AEROSOL ASSESSMENT    Patient  Irwin Pineda     73 y.o.   male     4/6/2024  11:07 AM    Breath Sounds Pre Procedure: Breath Sounds Pre-Tx JASON: Diminished                                  Breath Sounds Pre-Tx LLL: Diminished        Breath Sounds Pre-Tx RUL: Diminished        Breath Sounds Pre-Tx RML: Diminished        Breath Sounds Pre-Tx RLL: Diminished  Breath Sounds Post Procedure: Breath Sounds Post-Tx JASON: Diminished          Breath Sounds Post-Tx LLL: Diminished          Breath Sounds Post-Tx RUL: Diminished          Breath Sounds Post-Tx RML: Diminished          Breath Sounds Post-Tx RLL: Diminished                                       Heart Rate: Pre procedure Pre-Tx Pulse: 86           Post procedure      Resp Rate: Pre procedure Pre-Tx Resps: 14           Post procedure Post-Tx Resps: 20      Oxygen: none       Changed: Yes    SpO2:  SpO2: 91 %   without Oxygen                Nebulizer Therapy: Current medications Medications: Arformoterol, Budesonide      Changed: Yes    Comments: Titrated to TID.     Problem List:   Patient Active Problem List   Diagnosis    Pneumonia due to infectious agent    Type 2 diabetes mellitus with hyperglycemia, with long-term current use of insulin ([facility])    Hemoglobin A1c greater than 9.0%       Respiratory Therapist: Eric Pierce, RT

## 2024-04-06 NOTE — PROGRESS NOTES
Hospitalist Progress Note    NAME:   Irwin Pineda   : 1950   MRN: [id number]     Date/Time: 2024 1:55 PM  Patient PCP: Eric Garza MD    Estimated discharge date: greater than 24 hours.  Barriers: Clinical stability. Awaiting sputum cultures and respiratory panel results. Pulmonary following. CM to follow for discharge planning.      Assessment / Plan:  Acute hypoxemic respiratory failure        Right upper lobe CAP  - procalcitonin 0.05  - COVID in and influenza negative 24  - MRSA culture negative  - preliminary respiratory culture shows light normal daphnie, few gram positive cocci in chains   - respiratory panel pending  - repeat chest xray on 24 shows:  Right upper lobe airspace disease suspicious for pneumonia  Improved aeration in the left lung base  - CTA of chest shows:  Negative for pulmonary embolus.  2. Significant airspace disease in the right lung likely due to infection.  3. Area of consolidation in the posterior left lower lobe likely related to infection as well.  4. Bilateral hilar angel enlargement. Several enlarged lymph nodes in the mediastinum. These are likely reactive  - keep 02 sats greater than 92%  - incentive spirometry  - turn, cough and deep breathe  - prn and scheduled nebulizer treatments  - antitussive and mucolytic  - continue vancomycin, zosyn, and azithromycin  - oxygen challenge prior to discharge  - pulmonary Dr. Lacy to follow in am 24    2. Essential HTN      History of DVT      CAD  - pro   - continue norvasc, HCTZ, bb, statin     3. Type II DM  - hemoglobin A1c 10.7   - sliding scale insulin  - continue pharmacy substitution for home amaryl (glipizide)  - jardiance and metformin  - continue lantus 26 units  - accuchecks  - diabetic diet  - hypoglycemia protocol  - diabetes management following     4. Prostate cancer      Status post resection  - follow up outpatient with [facility]    Medical Decision Making:   I

## 2024-04-06 NOTE — PLAN OF CARE
Patient A&O X4, VSS, RA with 3% sats. No acute events overnight.    Problem: Discharge Planning  Goal: Discharge to home or other facility with appropriate resources  Outcome: Progressing  Flowsheets (Taken 4/5/2024 2000)  Discharge to home or other facility with appropriate resources: Identify barriers to discharge with patient and caregiver     Problem: Pain  Goal: Verbalizes/displays adequate comfort level or baseline comfort level  Outcome: Progressing  Flowsheets (Taken 4/5/2024 2000)  Verbalizes/displays adequate comfort level or baseline comfort level: Assess pain using appropriate pain scale     Problem: Safety - Adult  Goal: Free from fall injury  Outcome: Progressing     Problem: Nutrition Deficit:  Goal: Optimize nutritional status  Outcome: Progressing     Problem: Respiratory - Adult  Goal: Achieves optimal ventilation and oxygenation  4/6/2024 0354 by Claudia Lynn, RN  Outcome: Progressing  4/5/2024 2014 by Marycarmen Santiago RCP  Outcome: Progressing  Flowsheets (Taken 4/5/2024 2000 by Claudia Lynn, RN)  Achieves optimal ventilation and oxygenation: Assess for changes in respiratory status

## 2024-04-07 LAB
ANION GAP SERPL CALC-SCNC: 2 MMOL/L (ref 5–15)
BASOPHILS # BLD: 0.1 K/UL (ref 0–0.1)
BASOPHILS NFR BLD: 1 % (ref 0–1)
BUN SERPL-MCNC: 23 MG/DL (ref 6–20)
BUN/CREAT SERPL: 26 (ref 12–20)
CALCIUM SERPL-MCNC: 9.2 MG/DL (ref 8.5–10.1)
CHLORIDE SERPL-SCNC: 104 MMOL/L (ref 97–108)
CO2 SERPL-SCNC: 31 MMOL/L (ref 21–32)
CREAT SERPL-MCNC: 0.87 MG/DL (ref 0.7–1.3)
CRP SERPL-MCNC: 1.37 MG/DL (ref 0–0.3)
DIFFERENTIAL METHOD BLD: ABNORMAL
EOSINOPHIL # BLD: 0.4 K/UL (ref 0–0.4)
EOSINOPHIL NFR BLD: 3 % (ref 0–7)
ERYTHROCYTE [DISTWIDTH] IN BLOOD BY AUTOMATED COUNT: 14.6 % (ref 11.5–14.5)
GLUCOSE BLD STRIP.AUTO-MCNC: 123 MG/DL (ref 65–117)
GLUCOSE BLD STRIP.AUTO-MCNC: 148 MG/DL (ref 65–117)
GLUCOSE BLD STRIP.AUTO-MCNC: 204 MG/DL (ref 65–117)
GLUCOSE BLD STRIP.AUTO-MCNC: 238 MG/DL (ref 65–117)
GLUCOSE SERPL-MCNC: 105 MG/DL (ref 65–100)
HCT VFR BLD AUTO: 41.2 % (ref 36.6–50.3)
HGB BLD-MCNC: 12.7 G/DL (ref 12.1–17)
IMM GRANULOCYTES # BLD AUTO: 0.1 K/UL (ref 0–0.04)
IMM GRANULOCYTES NFR BLD AUTO: 1 % (ref 0–0.5)
LYMPHOCYTES # BLD: 2.5 K/UL (ref 0.8–3.5)
LYMPHOCYTES NFR BLD: 24 % (ref 12–49)
MCH RBC QN AUTO: 30.3 PG (ref 26–34)
MCHC RBC AUTO-ENTMCNC: 30.8 G/DL (ref 30–36.5)
MCV RBC AUTO: 98.3 FL (ref 80–99)
MONOCYTES # BLD: 0.7 K/UL (ref 0–1)
MONOCYTES NFR BLD: 7 % (ref 5–13)
NEUTS SEG # BLD: 6.6 K/UL (ref 1.8–8)
NEUTS SEG NFR BLD: 64 % (ref 32–75)
NRBC # BLD: 0 K/UL (ref 0–0.01)
NRBC BLD-RTO: 0 PER 100 WBC
PLATELET # BLD AUTO: 323 K/UL (ref 150–400)
PMV BLD AUTO: 8.9 FL (ref 8.9–12.9)
POTASSIUM SERPL-SCNC: 3.4 MMOL/L (ref 3.5–5.1)
RBC # BLD AUTO: 4.19 M/UL (ref 4.1–5.7)
SERVICE CMNT-IMP: ABNORMAL
SODIUM SERPL-SCNC: 137 MMOL/L (ref 136–145)
WBC # BLD AUTO: 10.4 K/UL (ref 4.1–11.1)

## 2024-04-07 PROCEDURE — 6360000002 HC RX W HCPCS: Performed by: STUDENT IN AN ORGANIZED HEALTH CARE EDUCATION/TRAINING PROGRAM

## 2024-04-07 PROCEDURE — 85025 COMPLETE CBC W/AUTO DIFF WBC: CPT

## 2024-04-07 PROCEDURE — 6370000000 HC RX 637 (ALT 250 FOR IP): Performed by: STUDENT IN AN ORGANIZED HEALTH CARE EDUCATION/TRAINING PROGRAM

## 2024-04-07 PROCEDURE — 36415 COLL VENOUS BLD VENIPUNCTURE: CPT

## 2024-04-07 PROCEDURE — 1100000003 HC PRIVATE W/ TELEMETRY

## 2024-04-07 PROCEDURE — 94760 N-INVAS EAR/PLS OXIMETRY 1: CPT

## 2024-04-07 PROCEDURE — 6360000002 HC RX W HCPCS: Performed by: NURSE PRACTITIONER

## 2024-04-07 PROCEDURE — 2580000003 HC RX 258: Performed by: STUDENT IN AN ORGANIZED HEALTH CARE EDUCATION/TRAINING PROGRAM

## 2024-04-07 PROCEDURE — 80048 BASIC METABOLIC PNL TOTAL CA: CPT

## 2024-04-07 PROCEDURE — 6370000000 HC RX 637 (ALT 250 FOR IP): Performed by: CLINICAL NURSE SPECIALIST

## 2024-04-07 PROCEDURE — 82962 GLUCOSE BLOOD TEST: CPT

## 2024-04-07 PROCEDURE — 6370000000 HC RX 637 (ALT 250 FOR IP): Performed by: NURSE PRACTITIONER

## 2024-04-07 PROCEDURE — 94640 AIRWAY INHALATION TREATMENT: CPT

## 2024-04-07 PROCEDURE — 86140 C-REACTIVE PROTEIN: CPT

## 2024-04-07 PROCEDURE — 2580000003 HC RX 258: Performed by: NURSE PRACTITIONER

## 2024-04-07 PROCEDURE — 94761 N-INVAS EAR/PLS OXIMETRY MLT: CPT

## 2024-04-07 RX ORDER — AMOXICILLIN AND CLAVULANATE POTASSIUM 875; 125 MG/1; MG/1
1 TABLET, FILM COATED ORAL 2 TIMES DAILY
Status: DISCONTINUED | OUTPATIENT
Start: 2024-04-07 | End: 2024-04-09 | Stop reason: HOSPADM

## 2024-04-07 RX ADMIN — IPRATROPIUM BROMIDE AND ALBUTEROL SULFATE 1 DOSE: .5; 3 SOLUTION RESPIRATORY (INHALATION) at 07:43

## 2024-04-07 RX ADMIN — AMOXICILLIN AND CLAVULANATE POTASSIUM 1 TABLET: 875; 125 TABLET, FILM COATED ORAL at 14:54

## 2024-04-07 RX ADMIN — ROSUVASTATIN CALCIUM 40 MG: 40 TABLET, FILM COATED ORAL at 09:50

## 2024-04-07 RX ADMIN — GUAIFENESIN 1200 MG: 600 TABLET, EXTENDED RELEASE ORAL at 09:50

## 2024-04-07 RX ADMIN — INSULIN LISPRO 2 UNITS: 100 INJECTION, SOLUTION INTRAVENOUS; SUBCUTANEOUS at 12:12

## 2024-04-07 RX ADMIN — EMPAGLIFLOZIN 25 MG: 25 TABLET, FILM COATED ORAL at 09:50

## 2024-04-07 RX ADMIN — GUAIFENESIN 1200 MG: 600 TABLET, EXTENDED RELEASE ORAL at 22:41

## 2024-04-07 RX ADMIN — SODIUM CHLORIDE, PRESERVATIVE FREE 10 ML: 5 INJECTION INTRAVENOUS at 10:00

## 2024-04-07 RX ADMIN — ARFORMOTEROL TARTRATE: 15 SOLUTION RESPIRATORY (INHALATION) at 07:43

## 2024-04-07 RX ADMIN — ARFORMOTEROL TARTRATE: 15 SOLUTION RESPIRATORY (INHALATION) at 19:56

## 2024-04-07 RX ADMIN — METFORMIN HYDROCHLORIDE 1000 MG: 500 TABLET ORAL at 09:50

## 2024-04-07 RX ADMIN — PIPERACILLIN SODIUM AND TAZOBACTAM SODIUM 3375 MG: 3; .375 INJECTION, POWDER, LYOPHILIZED, FOR SOLUTION INTRAVENOUS at 01:06

## 2024-04-07 RX ADMIN — METFORMIN HYDROCHLORIDE 1000 MG: 500 TABLET ORAL at 18:13

## 2024-04-07 RX ADMIN — IPRATROPIUM BROMIDE AND ALBUTEROL SULFATE 1 DOSE: .5; 3 SOLUTION RESPIRATORY (INHALATION) at 14:29

## 2024-04-07 RX ADMIN — INSULIN GLARGINE 26 UNITS: 100 INJECTION, SOLUTION SUBCUTANEOUS at 22:41

## 2024-04-07 RX ADMIN — AZITHROMYCIN 250 MG: 250 TABLET, FILM COATED ORAL at 09:50

## 2024-04-07 RX ADMIN — GLIPIZIDE 10 MG: 5 TABLET ORAL at 07:12

## 2024-04-07 RX ADMIN — AMLODIPINE BESYLATE 5 MG: 5 TABLET ORAL at 09:50

## 2024-04-07 RX ADMIN — PIPERACILLIN SODIUM AND TAZOBACTAM SODIUM 3375 MG: 3; .375 INJECTION, POWDER, LYOPHILIZED, FOR SOLUTION INTRAVENOUS at 09:58

## 2024-04-07 RX ADMIN — HYDROCHLOROTHIAZIDE 25 MG: 25 TABLET ORAL at 09:50

## 2024-04-07 RX ADMIN — ENOXAPARIN SODIUM 40 MG: 100 INJECTION SUBCUTANEOUS at 09:50

## 2024-04-07 RX ADMIN — METOPROLOL SUCCINATE 100 MG: 50 TABLET, EXTENDED RELEASE ORAL at 09:50

## 2024-04-07 RX ADMIN — AMOXICILLIN AND CLAVULANATE POTASSIUM 1 TABLET: 875; 125 TABLET, FILM COATED ORAL at 22:41

## 2024-04-07 RX ADMIN — VANCOMYCIN HYDROCHLORIDE 1000 MG: 1 INJECTION, POWDER, LYOPHILIZED, FOR SOLUTION INTRAVENOUS at 02:08

## 2024-04-07 RX ADMIN — SODIUM CHLORIDE: 9 INJECTION, SOLUTION INTRAVENOUS at 01:05

## 2024-04-07 RX ADMIN — IPRATROPIUM BROMIDE AND ALBUTEROL SULFATE 1 DOSE: .5; 3 SOLUTION RESPIRATORY (INHALATION) at 19:56

## 2024-04-07 ASSESSMENT — PAIN SCALES - GENERAL
PAINLEVEL_OUTOF10: 0
PAINLEVEL_OUTOF10: 0

## 2024-04-07 NOTE — PROGRESS NOTES
Hospitalist Progress Note    NAME:   Irwin Pineda   : 1950   MRN: [id number]     Date/Time: 2024 1:07 PM  Patient PCP: Eric Garza MD    Estimated discharge date: 24 hours  Barriers: clinical improvement      Assessment / Plan:    Acute hypoxemic respiratory failure:  Right upper lobe community-acquired pneumonia:  -On admission, patient was started on vancomycin, Zosyn, azithromycin.  -Azithromycin therapy completed.  -Will switch antibiotics to Augmentin anticipating discharge in next 24 hours.    Asymptomatic COVID-19 infection:  -6-minute walk test ordered.  -Patient is no longer hypoxic.  -Would not consider barcitinib, remdesivir or steroids at this time.     Hyponatremia:  -Continue to monitor.  -No intervention.     Essential hypertension:  -Resume prior to admission medication Norvasc, hydrochlorothiazide, metoprolol     Diabetes mellitus type 2:  -Resume prior to admission oral medications on discharge.  -Hemoglobin A1c 10.7.  -Patient might need long-acting insulin on discharge.     History of deep vein thrombosis:  -Patient completed 6 months of anticoagulation.  -Currently not on anticoagulation.     Coronary artery disease:  -Continue statin.     Prostate cancer s/p resection:  -Outpatient follow-up with PCP.    Medical Decision Making:   I personally reviewed labs: CBC, BMP  I personally reviewed imaging:  I personally reviewed EKG:  Toxic drug monitoring: None  Discussed case with: Patient and pulmonology.    Code Status: Full code  DVT Prophylaxis: Lovenox  GI Prophylaxis:    Subjective:     Patient was on 5 L supplemental nasal cannula oxygen yesterday.  Patient is on room air today.  6-minute walk test ordered.  IV antibiotics changed to oral antibiotics.  Viral panel positive for COVID infection.  Pulmonology recommendations noted.  Possible discharge in next 24 hours.      Objective:     VITALS:   Last 24hrs VS reviewed since prior progress note. Most recent  are:  Patient Vitals for the past 24 hrs:   BP Temp Temp src Pulse Resp SpO2   04/07/24 0950 136/64 -- -- 87 -- --   04/06/24 2004 134/77 98.3 °F (36.8 °C) Oral 91 17 90 %   04/06/24 1451 -- -- -- -- -- 92 %         Intake/Output Summary (Last 24 hours) at 4/7/2024 1307  Last data filed at 4/6/2024 1425  Gross per 24 hour   Intake 2714.39 ml   Output --   Net 2714.39 ml        I had a face to face encounter and independently examined this patient on 4/7/2024, as outlined below:  PHYSICAL EXAM:  General: Alert, cooperative  EENT:  EOMI. Anicteric sclerae.  Resp:  CTA bilaterally, no wheezing or rales.  No accessory muscle use  CV:  Regular  rhythm,  No edema  GI:  Soft, Non distended, Non tender.  +Bowel sounds  Neurologic:  Alert and oriented X 3, normal speech,   Psych:   Good insight. Not anxious nor agitated  Skin:  No rashes.  No jaundice    Reviewed most current lab test results and cultures  YES  Reviewed most current radiology test results   YES  Review and summation of old records today    NO  Reviewed patient's current orders and MAR    YES  PMH/SH reviewed - no change compared to H&P    Procedures: see electronic medical records for all procedures/Xrays and details which were not copied into this note but were reviewed prior to creation of Plan.      LABS:  I reviewed today's most current labs and imaging studies.  Pertinent labs include:  Recent Labs     04/05/24  0003 04/06/24  0110 04/07/24  0320   WBC 9.4 10.8 10.4   HGB 13.3 13.1 12.7   HCT 42.6 42.6 41.2   * 347 323     Recent Labs     04/05/24  0003 04/06/24  0110 04/07/24  0320    138 137   K 3.7 3.8 3.4*    103 104   CO2 31 29 31   GLUCOSE 199* 184* 105*   BUN 21* 21* 23*   CREATININE 1.04 1.03 0.87   CALCIUM 9.2 9.4 9.2       Signed: Joselo Lopez MD

## 2024-04-07 NOTE — PLAN OF CARE
Problem: Pain  Goal: Verbalizes/displays adequate comfort level or baseline comfort level  Outcome: Progressing     Problem: Safety - Adult  Goal: Free from fall injury  Outcome: Progressing     Problem: Nutrition Deficit:  Goal: Optimize nutritional status  Outcome: Progressing     Problem: Respiratory - Adult  Goal: Achieves optimal ventilation and oxygenation  4/6/2024 2101 by Beulah Darby LPN  Outcome: Progressing  4/6/2024 1938 by Marycarmen Santiago RCP  Outcome: Progressing  Flowsheets (Taken 4/6/2024 0800 by Keira Baird RN)  Achieves optimal ventilation and oxygenation: Assess for changes in respiratory status

## 2024-04-07 NOTE — PROGRESS NOTES
End of Shift Note    Bedside shift change report given to Jayson WEATHERS LPN (oncoming nurse) by Julito Layne RN (offgoing nurse).  Report included the following information SBAR, Kardex, Intake/Output, and MAR    Shift worked:  7A-7P     Shift summary and any significant changes:     Pt is alert and orientation.no pain complain.     Concerns for physician to address:       Zone phone for oncoming shift:   [phone]       Activity:     Number times ambulated in hallways past shift: 0  Number of times OOB to chair past shift: 0    Cardiac:   Cardiac Monitoring: No           Access:  Current line(s): PIV     Genitourinary:   Urinary status: voiding    Respiratory:      Chronic home O2 use?: NO  Incentive spirometer at bedside: NO       GI:     Current diet:  ADULT DIET; Regular; 4 carb choices (60 gm/meal); Splenda only with coffee/tea please. NO pink sweetener.  ADULT ORAL NUTRITION SUPPLEMENT; Breakfast, Lunch, Dinner; Low Calorie/High Protein Oral Supplement  Passing flatus: YES  Tolerating current diet: YES       Pain Management:   Patient states pain is manageable on current regimen: YES    Skin:     Interventions: increase time out of bed    Patient Safety:  Fall Score:    Interventions: gripper socks       Length of Stay:  Expected LOS: 5  Actual LOS: 5      Julito Layne RN

## 2024-04-07 NOTE — CONSULTS
Pulmonary, Critical Care, and Sleep Medicine      Name: Irwin Pineda MRN: [id number]   : 1950 Hospital: [facility]   Date: 2024  Admission date: 2024 Hospital Day: 6   Patient PCP: Eric Garza MD    History:   Pt is acutely ill . Medical records and data reviewed. Pt seen in consultation    IMPRESSION:   Acute respiratory failure with Hypoxia POA  Community-acquired pneumonia likely secondary suprainfection but procalcitonin normal  Acute COVID infection likely predated bacterial pneumonia-infiltrate somewhat atypical for COVID infection alone.  Rapid COVID on admission negative but PCR positive  Chronic sinusitis has seen ENT  Essential hypertension   History of DVT completed therapy   Type 2 diabetes mellitus   Prostate cancer followed by urology   Coronary artery disease   Body mass index is 30.11 kg/m².      RECOMMENDATIONS/PLAN:   CRP   Add systemic steroids low-dose   Follow blood sugars   If CRP elevated would consult pharmacy to see if he is a candidate for baricitinib   patient will need follow-up chest x-ray CT scan to make sure infiltrates resolve otherwise further investigation will be necessary.    Follow-up with Dr. Tracy as outpatient   Continue antibiotics to finish course; transition to oral antibiotics tomorrow   Follow culture results  DVT prophylaxis  Prescription drug management with home med reconciliation reviewed  Thanks you for asking us to see pt while in the hospital     Interval history:         [x] High complexity decision making was performed  [x] See my orders for details      Initial HPI:      I was asked by Joselo Lopez MD to see Irwin Pineda  a 73 y.o.   male in consultation for a chief complaint of atypical pneumonia.    Excerpts from admission 2024 or consult notes as follows:     \" Mr. Pineda  is a 73-year-old gentleman with past medical history significant for deep vein thrombosis s/p  mouth 2 times daily (with meals)   Yes Provider, Historical, MD   guaiFENesin 400 MG tablet Take 1 tablet by mouth 4 times daily as needed for Cough   Yes Provider, Shira, MD   amLODIPine (NORVASC) 5 MG tablet Take 1 tablet by mouth daily 20  Yes Automatic Reconciliation, Ar   empagliflozin (JARDIANCE) 25 MG tablet Take 1 tablet by mouth daily 20  Yes Automatic Reconciliation, Ar   fexofenadine (ALLEGRA) 180 MG tablet Take 1 tablet by mouth daily   Yes Automatic Reconciliation, Ar   glimepiride (AMARYL) 4 MG tablet Take 1 tablet by mouth 2 times daily 1/3/21  Yes Automatic Reconciliation, Ar   hydroCHLOROthiazide (HYDRODIURIL) 25 MG tablet Take 1 tablet by mouth daily 20  Yes Automatic Reconciliation, Ar   irbesartan (AVAPRO) 300 MG tablet Take 1 tablet by mouth daily 21  Yes Automatic Reconciliation, Ar   metoprolol succinate (TOPROL XL) 100 MG extended release tablet Take 1 tablet by mouth daily 20  Yes Automatic Reconciliation, Ar   rosuvastatin (CRESTOR) 40 MG tablet Take 1 tablet by mouth daily 21  Yes Automatic Reconciliation, Ar   terazosin (HYTRIN) 5 MG capsule Take 1 capsule by mouth nightly 20  Yes Automatic Reconciliation, Ar        History: includes:  No past medical history on file.   No past surgical history on file.   Social History     Tobacco Use    Smoking status: Former     Current packs/day: 0.00     Types: Cigarettes     Quit date: 1980     Years since quittin.2    Smokeless tobacco: Never   Substance Use Topics    Alcohol use: Not on file      No family history on file.      ROS:Pertinent items are noted in HPI.    Objective:     Vital Signs: Telemetry:     Intake/Output:   /77   Pulse 91   Temp 98.3 °F (36.8 °C) (Oral)   Resp 17   Ht 1.727 m (5' 7.99\")   Wt 89.8 kg (198 lb)   SpO2 90%   BMI 30.11 kg/m²     Temp (24hrs), Av.3 °F (36.8 °C), Min:98.3 °F (36.8 °C), Max:98.3 °F (36.8 °C)                  Wt Readings from Last 4

## 2024-04-08 LAB
ANION GAP SERPL CALC-SCNC: 5 MMOL/L (ref 5–15)
BASOPHILS # BLD: 0.2 K/UL (ref 0–0.1)
BASOPHILS NFR BLD: 2 % (ref 0–1)
BUN SERPL-MCNC: 20 MG/DL (ref 6–20)
BUN/CREAT SERPL: 26 (ref 12–20)
CALCIUM SERPL-MCNC: 9.6 MG/DL (ref 8.5–10.1)
CHLORIDE SERPL-SCNC: 103 MMOL/L (ref 97–108)
CO2 SERPL-SCNC: 26 MMOL/L (ref 21–32)
CREAT SERPL-MCNC: 0.78 MG/DL (ref 0.7–1.3)
DIFFERENTIAL METHOD BLD: ABNORMAL
EOSINOPHIL # BLD: 0.4 K/UL (ref 0–0.4)
EOSINOPHIL NFR BLD: 5 % (ref 0–7)
ERYTHROCYTE [DISTWIDTH] IN BLOOD BY AUTOMATED COUNT: 14.3 % (ref 11.5–14.5)
GLUCOSE BLD STRIP.AUTO-MCNC: 122 MG/DL (ref 65–117)
GLUCOSE BLD STRIP.AUTO-MCNC: 122 MG/DL (ref 65–117)
GLUCOSE BLD STRIP.AUTO-MCNC: 155 MG/DL (ref 65–117)
GLUCOSE BLD STRIP.AUTO-MCNC: 192 MG/DL (ref 65–117)
GLUCOSE BLD STRIP.AUTO-MCNC: 234 MG/DL (ref 65–117)
GLUCOSE SERPL-MCNC: 141 MG/DL (ref 65–100)
HCT VFR BLD AUTO: 44.4 % (ref 36.6–50.3)
HGB BLD-MCNC: 13.4 G/DL (ref 12.1–17)
IMM GRANULOCYTES # BLD AUTO: 0.1 K/UL (ref 0–0.04)
IMM GRANULOCYTES NFR BLD AUTO: 1 % (ref 0–0.5)
LYMPHOCYTES # BLD: 2.1 K/UL (ref 0.8–3.5)
LYMPHOCYTES NFR BLD: 22 % (ref 12–49)
MCH RBC QN AUTO: 29.8 PG (ref 26–34)
MCHC RBC AUTO-ENTMCNC: 30.2 G/DL (ref 30–36.5)
MCV RBC AUTO: 98.9 FL (ref 80–99)
MONOCYTES # BLD: 0.7 K/UL (ref 0–1)
MONOCYTES NFR BLD: 7 % (ref 5–13)
NEUTS SEG # BLD: 6.3 K/UL (ref 1.8–8)
NEUTS SEG NFR BLD: 64 % (ref 32–75)
NRBC # BLD: 0 K/UL (ref 0–0.01)
NRBC BLD-RTO: 0 PER 100 WBC
PLATELET # BLD AUTO: 302 K/UL (ref 150–400)
POTASSIUM SERPL-SCNC: 3.7 MMOL/L (ref 3.5–5.1)
RBC # BLD AUTO: 4.49 M/UL (ref 4.1–5.7)
SERVICE CMNT-IMP: ABNORMAL
SODIUM SERPL-SCNC: 134 MMOL/L (ref 136–145)
WBC # BLD AUTO: 9.9 K/UL (ref 4.1–11.1)

## 2024-04-08 PROCEDURE — 1100000003 HC PRIVATE W/ TELEMETRY

## 2024-04-08 PROCEDURE — 82962 GLUCOSE BLOOD TEST: CPT

## 2024-04-08 PROCEDURE — 6360000002 HC RX W HCPCS: Performed by: STUDENT IN AN ORGANIZED HEALTH CARE EDUCATION/TRAINING PROGRAM

## 2024-04-08 PROCEDURE — 6370000000 HC RX 637 (ALT 250 FOR IP): Performed by: NURSE PRACTITIONER

## 2024-04-08 PROCEDURE — 6370000000 HC RX 637 (ALT 250 FOR IP): Performed by: STUDENT IN AN ORGANIZED HEALTH CARE EDUCATION/TRAINING PROGRAM

## 2024-04-08 PROCEDURE — 94640 AIRWAY INHALATION TREATMENT: CPT

## 2024-04-08 PROCEDURE — 85025 COMPLETE CBC W/AUTO DIFF WBC: CPT

## 2024-04-08 PROCEDURE — 6370000000 HC RX 637 (ALT 250 FOR IP): Performed by: CLINICAL NURSE SPECIALIST

## 2024-04-08 PROCEDURE — 94761 N-INVAS EAR/PLS OXIMETRY MLT: CPT

## 2024-04-08 PROCEDURE — 80048 BASIC METABOLIC PNL TOTAL CA: CPT

## 2024-04-08 PROCEDURE — 36415 COLL VENOUS BLD VENIPUNCTURE: CPT

## 2024-04-08 PROCEDURE — 2580000003 HC RX 258: Performed by: STUDENT IN AN ORGANIZED HEALTH CARE EDUCATION/TRAINING PROGRAM

## 2024-04-08 PROCEDURE — 94760 N-INVAS EAR/PLS OXIMETRY 1: CPT

## 2024-04-08 PROCEDURE — 99232 SBSQ HOSP IP/OBS MODERATE 35: CPT | Performed by: CLINICAL NURSE SPECIALIST

## 2024-04-08 RX ORDER — GLIPIZIDE 5 MG/1
10 TABLET ORAL
Status: DISCONTINUED | OUTPATIENT
Start: 2024-04-08 | End: 2024-04-09 | Stop reason: HOSPADM

## 2024-04-08 RX ORDER — CALCIUM CARB/VITAMIN D3/VIT K1 500-100-40
1 TABLET,CHEWABLE ORAL DAILY
Qty: 100 EACH | Refills: 0 | Status: SHIPPED | OUTPATIENT
Start: 2024-04-08

## 2024-04-08 RX ORDER — BLOOD-GLUCOSE SENSOR
1 EACH MISCELLANEOUS
Qty: 2 EACH | Refills: 5 | Status: SHIPPED | OUTPATIENT
Start: 2024-04-08

## 2024-04-08 RX ORDER — INSULIN GLARGINE 100 [IU]/ML
22 INJECTION, SOLUTION SUBCUTANEOUS NIGHTLY
Status: DISCONTINUED | OUTPATIENT
Start: 2024-04-08 | End: 2024-04-09

## 2024-04-08 RX ADMIN — GUAIFENESIN 1200 MG: 600 TABLET, EXTENDED RELEASE ORAL at 21:20

## 2024-04-08 RX ADMIN — IPRATROPIUM BROMIDE AND ALBUTEROL 1 PUFF: 20; 100 SPRAY, METERED RESPIRATORY (INHALATION) at 19:57

## 2024-04-08 RX ADMIN — SODIUM CHLORIDE, PRESERVATIVE FREE 10 ML: 5 INJECTION INTRAVENOUS at 21:23

## 2024-04-08 RX ADMIN — ENOXAPARIN SODIUM 40 MG: 100 INJECTION SUBCUTANEOUS at 09:23

## 2024-04-08 RX ADMIN — AMLODIPINE BESYLATE 5 MG: 5 TABLET ORAL at 09:24

## 2024-04-08 RX ADMIN — SODIUM CHLORIDE, PRESERVATIVE FREE 10 ML: 5 INJECTION INTRAVENOUS at 09:24

## 2024-04-08 RX ADMIN — ROSUVASTATIN CALCIUM 40 MG: 40 TABLET, FILM COATED ORAL at 09:24

## 2024-04-08 RX ADMIN — IPRATROPIUM BROMIDE AND ALBUTEROL 1 PUFF: 20; 100 SPRAY, METERED RESPIRATORY (INHALATION) at 08:31

## 2024-04-08 RX ADMIN — METOPROLOL SUCCINATE 100 MG: 50 TABLET, EXTENDED RELEASE ORAL at 09:35

## 2024-04-08 RX ADMIN — METFORMIN HYDROCHLORIDE 1000 MG: 500 TABLET ORAL at 09:24

## 2024-04-08 RX ADMIN — IPRATROPIUM BROMIDE AND ALBUTEROL 1 PUFF: 20; 100 SPRAY, METERED RESPIRATORY (INHALATION) at 13:37

## 2024-04-08 RX ADMIN — EMPAGLIFLOZIN 25 MG: 25 TABLET, FILM COATED ORAL at 09:24

## 2024-04-08 RX ADMIN — AMOXICILLIN AND CLAVULANATE POTASSIUM 1 TABLET: 875; 125 TABLET, FILM COATED ORAL at 21:20

## 2024-04-08 RX ADMIN — HYDROCHLOROTHIAZIDE 25 MG: 25 TABLET ORAL at 09:24

## 2024-04-08 RX ADMIN — GLIPIZIDE 10 MG: 5 TABLET ORAL at 17:16

## 2024-04-08 RX ADMIN — MOMETASONE FUROATE AND FORMOTEROL FUMARATE DIHYDRATE 2 PUFF: 100; 5 AEROSOL RESPIRATORY (INHALATION) at 19:57

## 2024-04-08 RX ADMIN — INSULIN LISPRO 2 UNITS: 100 INJECTION, SOLUTION INTRAVENOUS; SUBCUTANEOUS at 09:36

## 2024-04-08 RX ADMIN — AMOXICILLIN AND CLAVULANATE POTASSIUM 1 TABLET: 875; 125 TABLET, FILM COATED ORAL at 09:24

## 2024-04-08 RX ADMIN — MOMETASONE FUROATE AND FORMOTEROL FUMARATE DIHYDRATE 2 PUFF: 100; 5 AEROSOL RESPIRATORY (INHALATION) at 08:32

## 2024-04-08 RX ADMIN — METFORMIN HYDROCHLORIDE 1000 MG: 500 TABLET ORAL at 17:16

## 2024-04-08 RX ADMIN — GLIPIZIDE 10 MG: 5 TABLET ORAL at 09:35

## 2024-04-08 RX ADMIN — GUAIFENESIN 1200 MG: 600 TABLET, EXTENDED RELEASE ORAL at 09:23

## 2024-04-08 RX ADMIN — INSULIN GLARGINE 22 UNITS: 100 INJECTION, SOLUTION SUBCUTANEOUS at 21:20

## 2024-04-08 NOTE — PLAN OF CARE
Problem: Discharge Planning  Goal: Discharge to home or other facility with appropriate resources  Outcome: Progressing  Flowsheets (Taken 4/6/2024 0800 by Keira Baird, RN)  Discharge to home or other facility with appropriate resources:   Identify barriers to discharge with patient and caregiver   Arrange for needed discharge resources and transportation as appropriate   Identify discharge learning needs (meds, wound care, etc)     Problem: Pain  Goal: Verbalizes/displays adequate comfort level or baseline comfort level  Outcome: Progressing  Flowsheets (Taken 4/8/2024 1357)  Verbalizes/displays adequate comfort level or baseline comfort level:   Encourage patient to monitor pain and request assistance   Administer analgesics based on type and severity of pain and evaluate response   Assess pain using appropriate pain scale     Problem: Safety - Adult  Goal: Free from fall injury  Outcome: Progressing     Problem: Nutrition Deficit:  Goal: Optimize nutritional status  Outcome: Progressing  Flowsheets (Taken 4/8/2024 1357)  Nutrient intake appropriate for improving, restoring, or maintaining nutritional needs: Assess nutritional status and recommend course of action     Problem: Respiratory - Adult  Goal: Achieves optimal ventilation and oxygenation  Outcome: Progressing  Flowsheets (Taken 4/8/2024 1357)  Achieves optimal ventilation and oxygenation:   Assess for changes in respiratory status   Oxygen supplementation based on oxygen saturation or arterial blood gases   Assess for changes in mentation and behavior

## 2024-04-08 NOTE — PROGRESS NOTES
Pharmacist COVID-19 Therapy Consult    Consult reason:   Baricitinib criteria for use evaluation for utilization in COVID-19    Plan:   Patient is Covid + and has elevated CRP  Patient does NOT meet criteria for use due to On Room Air    Thank you,  GLYNN CALLE, [facility]

## 2024-04-08 NOTE — PLAN OF CARE
Problem: Pain  Goal: Verbalizes/displays adequate comfort level or baseline comfort level  Outcome: Progressing     Problem: Safety - Adult  Goal: Free from fall injury  Outcome: Progressing     Problem: Nutrition Deficit:  Goal: Optimize nutritional status  Outcome: Progressing     Problem: Respiratory - Adult  Goal: Achieves optimal ventilation and oxygenation  4/7/2024 2305 by Beulah Darby LPN  Outcome: Progressing  4/7/2024 1956 by Marycarmen Santiago RCP  Outcome: Progressing

## 2024-04-08 NOTE — PROGRESS NOTES
Pulmonary, Critical Care, and Sleep Medicine      Name: Irwin Pineda MRN: [id number]   : 1950 Hospital: [facility]   Date: 2024  Admission date: 2024 Hospital Day: 7   Patient PCP: Eric Garza MD    History:   Pt is acutely ill . Medical records and data reviewed. Pt seen in consultation    IMPRESSION:   Acute respiratory failure with Hypoxia POA  Community-acquired pneumonia likely secondary suprainfection but procalcitonin normal  Acute COVID infection likely predated bacterial pneumonia-infiltrate somewhat atypical for COVID infection alone.  Rapid COVID on admission negative but PCR positive  Chronic sinusitis has seen ENT  Essential hypertension   History of DVT completed therapy   Type 2 diabetes mellitus   Prostate cancer followed by urology   Coronary artery disease   Body mass index is 30.11 kg/m².      RECOMMENDATIONS/PLAN:   Follow-up with Dr. Tracy as outpatient   Will plan to repeat chest imaging in 4-6 weeks  Continue antibiotics to finish course  Follow culture results  DVT prophylaxis  Prescription drug management with home med reconciliation reviewed  Thank you for asking us to see pt while in the hospital - will see again as needed and arrange outpt follow up     Interval history:         [x] High complexity decision making was performed  [x] See my orders for details      Initial HPI:      24 : Sitting up in chair. Feels well. On RA. CRP 1.37. Some cough. Minimal sputum. Afebrile.     I was asked by Joselo Lopez MD to see Irwin Pineda  a 73 y.o.   male in consultation for a chief complaint of atypical pneumonia.    Excerpts from admission 2024 or consult notes as follows:     \" Mr. Pineda  is a 73-year-old gentleman with past medical history significant for deep vein thrombosis s/p completion of duration of anticoagulation treatment, coronary artery disease, prostate cancer s/p resection, diabetes mellitus  Images are partially degraded secondary to respiratory motion artifact. THYROID: No nodule. MEDIASTINUM: There is an enlarged subcarinal node measuring 13 mm. RADHA: Enlarged lymph nodes are seen in the right hilum measuring up to 14 mm in size. Several prominent lymph nodes are seen in the radha. THORACIC AORTA: No aneurysm. HEART: Normal in size. ESOPHAGUS: No wall thickening or dilatation. TRACHEA/BRONCHI: Patent. PLEURA: No effusion or pneumothorax. LUNGS: There are numerous small groundglass nodules in the right upper lobe. There is an area of partial consolidation in the posterior right upper lobe. Several micronodules are seen in the right middle lobe there is a 6 mm pulmonary nodule in the anterolateral right middle lobe. There is bronchial wall thickening in the right lower lobe with an area of partial consolidation. There is an area of consolidation in the posterior left lower lobe. UPPER ABDOMEN: There is a 7.2 cm cyst in the superior pole of the right kidney. There is a 2.5 cm cyst in the posterior left kidney. BONES: No aggressive bone lesion or fracture. There is chronic formae of several right-sided ribs.     1. Negative for pulmonary embolus. 2. Significant airspace disease in the right lung likely due to infection. 3. Area of consolidation in the posterior left lower lobe likely related to infection as well. 4. Bilateral hilar angel enlargement. Several enlarged lymph nodes in the mediastinum. These are likely reactive.       I personally reviewed laboratory testing, pulmonary imaging, radiology reports, and pulse oximetry data.        Please note that this dictation was completed with [employer], the [employer] voice recognition software.  Quite often unanticipated grammatical, syntax, homophones, and other interpretive errors are inadvertently transcribed by the computer software.  Please disregard these errors.  Please excuse any errors that have escaped final

## 2024-04-08 NOTE — PROGRESS NOTES
Hospitalist Progress Note    NAME:   Irwin Pineda   : 1950   MRN: [id number]     Date/Time: 2024 12:11 PM  Patient PCP: Eric Garza MD    Estimated discharge date:   Barriers: O2 challenge, clinical improvement      Assessment / Plan:    Acute hypoxemic respiratory failure:  Right upper lobe community-acquired pneumonia:  -On admission, patient was started on vancomycin, Zosyn, azithromycin.  -Azithromycin therapy completed.  -Will switch antibiotics to Augmentin today.       Asymptomatic COVID-19 infection:  -6-minute walk test ordered.  -Patient is no longer hypoxic.  CRP mildly elevated but not meeting criteria for baricitinib(room air)  On room air but tachypneic     Hyponatremia:  -Continue to monitor.  -No intervention.     Essential hypertension:  -Resume prior to admission medication Norvasc, hydrochlorothiazide, metoprolol     Diabetes mellitus type 2:  -Resume prior to admission oral medications on discharge.  -Hemoglobin A1c 10.7.  -Patient might need long-acting insulin on discharge.     History of deep vein thrombosis:  -Patient completed 6 months of anticoagulation.  -Currently not on anticoagulation.     Coronary artery disease:  -Continue statin.     Prostate cancer s/p resection:  -Outpatient follow-up with PCP.    Medical Decision Making:   I personally reviewed labs: CBC, BMP  I personally reviewed imaging:  I personally reviewed EKG:  Toxic drug monitoring: None  Discussed case with: Patient and pulmonology.    Code Status: Full code  DVT Prophylaxis: Lovenox  GI Prophylaxis:    Subjective:     Patient was on 5 L supplemental nasal cannula oxygen yesterday.  Patient on room air now, but tachypneic easily winded with walking      Objective:     VITALS:   Last 24hrs VS reviewed since prior progress note. Most recent are:  Patient Vitals for the past 24 hrs:   BP Temp Temp src Pulse Resp SpO2   24 0923 (!) 151/74 98.1 °F (36.7 °C) Oral 93 16 92 %    04/08/24 0053 129/76 98 °F (36.7 °C) Oral 81 17 92 %   04/07/24 1944 134/81 97.9 °F (36.6 °C) Oral 82 17 90 %         No intake or output data in the 24 hours ending 04/08/24 1211       I had a face to face encounter and independently examined this patient on 4/8/2024, as outlined below:  PHYSICAL EXAM:  General: Alert, cooperative  EENT:  EOMI. Anicteric sclerae.  Resp:  CTA bilaterally, no wheezing or rales.  No accessory muscle use  CV:  Regular  rhythm,  No edema  GI:  Soft, Non distended, Non tender.  +Bowel sounds  Neurologic:  Alert and oriented X 3, normal speech,   Psych:   Good insight. Not anxious nor agitated  Skin:  No rashes.  No jaundice    Reviewed most current lab test results and cultures  YES  Reviewed most current radiology test results   YES  Review and summation of old records today    NO  Reviewed patient's current orders and MAR    YES  PMH/SH reviewed - no change compared to H&P    Procedures: see electronic medical records for all procedures/Xrays and details which were not copied into this note but were reviewed prior to creation of Plan.      LABS:  I reviewed today's most current labs and imaging studies.  Pertinent labs include:  Recent Labs     04/06/24  0110 04/07/24  0320 04/08/24  0410   WBC 10.8 10.4 9.9   HGB 13.1 12.7 13.4   HCT 42.6 41.2 44.4    323 302       Recent Labs     04/06/24  0110 04/07/24  0320 04/08/24  0410    137 134*   K 3.8 3.4* 3.7    104 103   CO2 29 31 26   GLUCOSE 184* 105* 141*   BUN 21* 23* 20   CREATININE 1.03 0.87 0.78   CALCIUM 9.4 9.2 9.6         Signed: Dyana Dennis MD

## 2024-04-08 NOTE — DIABETES MGMT
[facility]  PROGRAM FOR DIABETES HEALTH  DIABETES MANAGEMENT CONSULT    Consulted by  Merlyn Li APRN - NP  for advanced nursing evaluation and care for inpatient blood glucose management.    Evaluation and Action Plan   Irwin Pineda is a 73 year old gentleman, with a history of Type 2 Diabetes, HTN, CAD, prostate cancer s/p resection who is admitted with acute hypoxemic respiratory failure s/t right upper lobe community acquired pneumonia.  The Program for Diabetes Health has been consulted to assist in glycemic management and advanced diabetes management assessment this admission.    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  He tells us that he has lived with Type 2 Diabetes for over 15 years and consistently takes 25mg Jardiance daily, 1000mg metformin BID and Amaryl 4mg BID.  He was not able to afford the high out of pocket cost of Ozempic last year and his insurance is no longer covering Janumet.  He shares that he was the sole caregiver of his wife for a year and a a half until she passed away last summer from liver disease.  He reports that his A1C is chronically in the 9% range, best was 8%, but unfortunately back up to 10.7%.  He shares that he has intentionally lost weight (about 20 lbs) through eating better and being more active when walking around at work.    This admission, he has been started on 10 units glipizide BID, 25mg jardiance daily and 8 units glargine daily which was increased to weight based, moderate dose- 26 units daily. Glucose pattern is improving with consistent dosing and will continue this strategy.    Management Rationale Action Plan   Medication   Basal needs Using 0.25 units/kg/D  Reduced Lantus to 22 units daily as fasting glucose below target.   Corrective insulin Using medium sensitivity     Additional orders  Continue carb consistent diet (60g CHO/meal)  Continue 10mg glipizide BID. Can increase am dose to 20mg if he  24 0053   BP: 129/76   Pulse: 81   Resp: 17   Temp: 98 °F (36.7 °C)   SpO2: 92%     Skin  Warm and dry. Acanthosis noted along neckline. No lipohypertrophy or lipoatrophy noted at injection sites   Extremities No foot wounds      Laboratory  Recent Labs     24  0110 24  0320 24  0410   WBC 10.8 10.4 9.9   HGB 13.1 12.7 13.4   HCT 42.6 41.2 44.4   MCV 96.2 98.3 98.9    323 302       Recent Labs     24  0110 24  0320 24  0410    137 134*   K 3.8 3.4* 3.7    104 103   CO2 29 31 26   BUN 21* 23* 20   CREATININE 1.03 0.87 0.78       Lab Results   Component Value Date    ALT 17 2024    AST 11 (L) 2024    ALKPHOS 67 2024    BILITOT 0.3 2024     No results found for: \"TSH\", \"TSHREFLEX\", \"TSHFT4\", \"TSHELE\", \"NRB6AXS\", \"TSHHS\"  Lab Results   Component Value Date    LABA1C 10.7 (H) 2024     Blood glucose pattern      Significant diabetes-related events over the past 24-72 hours  A1C 10.7%  Fasting B  Pre-prandial: 148-238  Basal: 26 units Glargine daily  Correction: 10 units  Jardiance 25mg daily  Glipizide 10mg BID  Metformin 1000mg BID  Total daily insulin dose in the last 24 hours: 18 units      Assessment and Nursing Intervention   Nursing Diagnosis Risk for unstable blood glucose pattern   Nursing Intervention Domain 5250 Decision-making Support   Nursing Interventions Examined current inpatient diabetes/blood glucose control   Explored factors facilitating and impeding inpatient management  Explored corrective strategies with patient and responsible inpatient provider   Informed patient of rational for insulin strategy while hospitalized     Nursing Diagnosis 83239 Ineffective Health Management   Nursing Intervention Domain 5250 Decision-making Support   Nursing Interventions Identified diabetes self-management practices impeding diabetes control  Discussed diabetes survival skills related to  Healthy Plate eating plan; given

## 2024-04-08 NOTE — CARE COORDINATION
Transition of Care Plan:     RUR: 9%  Prior Level of Functioning: Independent   Disposition: Home with son and no needs   If SNF or IPR: Date FOC offered:   Date FOC received:   Accepting facility:   Date authorization started with reference number:   Date authorization received and expires:   Follow up appointments: PCP  DME needed: No needs   Transportation at discharge: Pt's family   IM/IMM Medicare/ letter given: 4/5/24  Is patient a [place] and connected with [facility]? No              If yes, was  transfer form completed and [facility] notified? No  Caregiver Contact: Pt's son   Discharge Caregiver contacted prior to discharge? Pt will contacted   Care Conference needed? No  Barriers to discharge:  Medical clearance      CM reviewed pt's chart and pt is not medically stable for d/c.    CM will continue to follow and assist with d/c planning.    Awilda Junior

## 2024-04-08 NOTE — PROGRESS NOTES
End of Shift Note  MURRAY Taylor  Bedside shift change report given to MURARY Taylor (oncoming nurse) by Lex Prasad RN (offgoing nurse).  Report included the following information SBAR, Procedure Summary, Intake/Output, MAR, Recent Results, Med Rec Status, and Alarm Parameters     Shift worked:  7a-7p     Shift summary and any significant changes:     No events this shift     Concerns for physician to address:     Zone phone for oncoming shift:          Activity:     Number times ambulated in hallways past shift: 0  Number of times OOB to chair past shift: 0    Cardiac:   Cardiac Monitoring: No           Access:  Current line(s): PIV     Genitourinary:   Urinary status: voiding    Respiratory:      Chronic home O2 use?: NO  Incentive spirometer at bedside: N/A       GI:     Current diet:  ADULT DIET; Regular; 4 carb choices (60 gm/meal); Splenda only with coffee/tea please. NO pink sweetener.  ADULT ORAL NUTRITION SUPPLEMENT; Breakfast, Lunch, Dinner; Low Calorie/High Protein Oral Supplement  Passing flatus: YES  Tolerating current diet: YES       Pain Management:   Patient states pain is manageable on current regimen: N/A    Skin:     Interventions: specialty bed, float heels, increase time out of bed, foam dressing, PT/OT consult, limit briefs, internal/external urinary devices, and nutritional support    Patient Safety:  Fall Score:    Interventions: bed/chair alarm, assistive device (walker, cane. etc), gripper socks, pt to call before getting OOB, and stay with me (per policy)       Length of Stay:  Expected LOS: 7  Actual LOS: 6      Lex Prasad RN

## 2024-04-09 VITALS
HEIGHT: 68 IN | HEART RATE: 91 BPM | RESPIRATION RATE: 20 BRPM | BODY MASS INDEX: 30.01 KG/M2 | SYSTOLIC BLOOD PRESSURE: 141 MMHG | TEMPERATURE: 97.9 F | OXYGEN SATURATION: 92 % | DIASTOLIC BLOOD PRESSURE: 86 MMHG | WEIGHT: 198 LBS

## 2024-04-09 PROBLEM — J96.01 ACUTE RESPIRATORY FAILURE WITH HYPOXIA (HCC): Status: ACTIVE | Noted: 2024-04-09

## 2024-04-09 LAB
ANION GAP SERPL CALC-SCNC: 5 MMOL/L (ref 5–15)
BASOPHILS # BLD: 0.1 K/UL (ref 0–0.1)
BASOPHILS NFR BLD: 1 % (ref 0–1)
BUN SERPL-MCNC: 26 MG/DL (ref 6–20)
BUN/CREAT SERPL: 31 (ref 12–20)
CALCIUM SERPL-MCNC: 9.7 MG/DL (ref 8.5–10.1)
CHLORIDE SERPL-SCNC: 100 MMOL/L (ref 97–108)
CO2 SERPL-SCNC: 27 MMOL/L (ref 21–32)
CREAT SERPL-MCNC: 0.83 MG/DL (ref 0.7–1.3)
DIFFERENTIAL METHOD BLD: ABNORMAL
EOSINOPHIL # BLD: 0.7 K/UL (ref 0–0.4)
EOSINOPHIL NFR BLD: 6 % (ref 0–7)
ERYTHROCYTE [DISTWIDTH] IN BLOOD BY AUTOMATED COUNT: 14.6 % (ref 11.5–14.5)
GLUCOSE BLD STRIP.AUTO-MCNC: 191 MG/DL (ref 65–117)
GLUCOSE BLD STRIP.AUTO-MCNC: 86 MG/DL (ref 65–117)
GLUCOSE SERPL-MCNC: 91 MG/DL (ref 65–100)
HCT VFR BLD AUTO: 42.9 % (ref 36.6–50.3)
HGB BLD-MCNC: 13.4 G/DL (ref 12.1–17)
IMM GRANULOCYTES # BLD AUTO: 0.1 K/UL (ref 0–0.04)
IMM GRANULOCYTES NFR BLD AUTO: 1 % (ref 0–0.5)
LYMPHOCYTES # BLD: 2.7 K/UL (ref 0.8–3.5)
LYMPHOCYTES NFR BLD: 23 % (ref 12–49)
MCH RBC QN AUTO: 30 PG (ref 26–34)
MCHC RBC AUTO-ENTMCNC: 31.2 G/DL (ref 30–36.5)
MCV RBC AUTO: 96 FL (ref 80–99)
MONOCYTES # BLD: 0.7 K/UL (ref 0–1)
MONOCYTES NFR BLD: 6 % (ref 5–13)
NEUTS SEG # BLD: 7.2 K/UL (ref 1.8–8)
NEUTS SEG NFR BLD: 63 % (ref 32–75)
NRBC # BLD: 0 K/UL (ref 0–0.01)
NRBC BLD-RTO: 0 PER 100 WBC
PLATELET # BLD AUTO: 318 K/UL (ref 150–400)
PMV BLD AUTO: 9 FL (ref 8.9–12.9)
POTASSIUM SERPL-SCNC: 3.6 MMOL/L (ref 3.5–5.1)
RBC # BLD AUTO: 4.47 M/UL (ref 4.1–5.7)
SERVICE CMNT-IMP: ABNORMAL
SERVICE CMNT-IMP: NORMAL
SODIUM SERPL-SCNC: 132 MMOL/L (ref 136–145)
WBC # BLD AUTO: 11.6 K/UL (ref 4.1–11.1)

## 2024-04-09 PROCEDURE — 36415 COLL VENOUS BLD VENIPUNCTURE: CPT

## 2024-04-09 PROCEDURE — 82962 GLUCOSE BLOOD TEST: CPT

## 2024-04-09 PROCEDURE — 99231 SBSQ HOSP IP/OBS SF/LOW 25: CPT

## 2024-04-09 PROCEDURE — 2580000003 HC RX 258: Performed by: STUDENT IN AN ORGANIZED HEALTH CARE EDUCATION/TRAINING PROGRAM

## 2024-04-09 PROCEDURE — 80048 BASIC METABOLIC PNL TOTAL CA: CPT

## 2024-04-09 PROCEDURE — 94640 AIRWAY INHALATION TREATMENT: CPT

## 2024-04-09 PROCEDURE — 6370000000 HC RX 637 (ALT 250 FOR IP): Performed by: CLINICAL NURSE SPECIALIST

## 2024-04-09 PROCEDURE — 85025 COMPLETE CBC W/AUTO DIFF WBC: CPT

## 2024-04-09 PROCEDURE — 6370000000 HC RX 637 (ALT 250 FOR IP): Performed by: STUDENT IN AN ORGANIZED HEALTH CARE EDUCATION/TRAINING PROGRAM

## 2024-04-09 PROCEDURE — 6360000002 HC RX W HCPCS: Performed by: STUDENT IN AN ORGANIZED HEALTH CARE EDUCATION/TRAINING PROGRAM

## 2024-04-09 PROCEDURE — 6370000000 HC RX 637 (ALT 250 FOR IP): Performed by: NURSE PRACTITIONER

## 2024-04-09 RX ORDER — AMOXICILLIN AND CLAVULANATE POTASSIUM 875; 125 MG/1; MG/1
1 TABLET, FILM COATED ORAL 2 TIMES DAILY
Qty: 6 TABLET | Refills: 0 | Status: SHIPPED | OUTPATIENT
Start: 2024-04-09 | End: 2024-04-12

## 2024-04-09 RX ORDER — INSULIN GLARGINE 100 [IU]/ML
18 INJECTION, SOLUTION SUBCUTANEOUS NIGHTLY
Qty: 10 ML | Refills: 2 | Status: SHIPPED | OUTPATIENT
Start: 2024-04-09

## 2024-04-09 RX ORDER — AMOXICILLIN AND CLAVULANATE POTASSIUM 875; 125 MG/1; MG/1
1 TABLET, FILM COATED ORAL 2 TIMES DAILY
Qty: 6 TABLET | Refills: 0 | Status: SHIPPED | OUTPATIENT
Start: 2024-04-09 | End: 2024-04-09

## 2024-04-09 RX ORDER — INSULIN GLARGINE 100 [IU]/ML
18 INJECTION, SOLUTION SUBCUTANEOUS NIGHTLY
Status: DISCONTINUED | OUTPATIENT
Start: 2024-04-09 | End: 2024-04-09 | Stop reason: HOSPADM

## 2024-04-09 RX ADMIN — ROSUVASTATIN CALCIUM 40 MG: 40 TABLET, FILM COATED ORAL at 09:41

## 2024-04-09 RX ADMIN — SODIUM CHLORIDE, PRESERVATIVE FREE 10 ML: 5 INJECTION INTRAVENOUS at 10:06

## 2024-04-09 RX ADMIN — METFORMIN HYDROCHLORIDE 1000 MG: 500 TABLET ORAL at 09:40

## 2024-04-09 RX ADMIN — GLIPIZIDE 10 MG: 5 TABLET ORAL at 05:46

## 2024-04-09 RX ADMIN — IPRATROPIUM BROMIDE AND ALBUTEROL 1 PUFF: 20; 100 SPRAY, METERED RESPIRATORY (INHALATION) at 08:03

## 2024-04-09 RX ADMIN — GUAIFENESIN 1200 MG: 600 TABLET, EXTENDED RELEASE ORAL at 09:41

## 2024-04-09 RX ADMIN — METOPROLOL SUCCINATE 100 MG: 50 TABLET, EXTENDED RELEASE ORAL at 09:41

## 2024-04-09 RX ADMIN — EMPAGLIFLOZIN 25 MG: 25 TABLET, FILM COATED ORAL at 09:41

## 2024-04-09 RX ADMIN — ENOXAPARIN SODIUM 40 MG: 100 INJECTION SUBCUTANEOUS at 09:43

## 2024-04-09 RX ADMIN — AMLODIPINE BESYLATE 5 MG: 5 TABLET ORAL at 09:41

## 2024-04-09 RX ADMIN — HYDROCHLOROTHIAZIDE 25 MG: 25 TABLET ORAL at 09:41

## 2024-04-09 RX ADMIN — MOMETASONE FUROATE AND FORMOTEROL FUMARATE DIHYDRATE 2 PUFF: 100; 5 AEROSOL RESPIRATORY (INHALATION) at 08:04

## 2024-04-09 RX ADMIN — AMOXICILLIN AND CLAVULANATE POTASSIUM 1 TABLET: 875; 125 TABLET, FILM COATED ORAL at 09:41

## 2024-04-09 ASSESSMENT — PAIN SCALES - GENERAL: PAINLEVEL_OUTOF10: 0

## 2024-04-09 NOTE — DISCHARGE SUMMARY
Hospitalist Discharge Summary     Patient ID:  Irwin Pineda  [id number]  73 y.o.  1950  4/2/2024    PCP on record: Eric Garza MD    Admit date: 4/2/2024  Discharge date and time: 4/14/2024    DISCHARGE DIAGNOSIS:    Failure  Right upper lobe acquired pneumonia  Hyponatremia  HTN  DM  DVT POA  CAD  Prostate cancer s/p resection      CONSULTATIONS:  IP CONSULT TO PULMONOLOGY  IP CONSULT TO DIABETES MANAGEMENT  IP CONSULT TO PHARMACY    Excerpted HPI from H&P of Joselo Lopez MD:    He is a 73-year-old gentleman with past medical history significant for deep vein thrombosis s/p completion of duration of anticoagulation treatment, coronary artery disease, prostate cancer s/p resection, diabetes mellitus type 2, essential hypertension presented to the hospital complaining of shortness of breath.  Patient has been experiencing progressively worsening shortness of breath on exertion for 2 weeks prior to admission.  Patient has been experiencing chronic cough for about 1 year.  In the ER, patient was hypoxic to 82% on room air on ambulation.  Patient quit smoking 40 years ago.     In the ER, vital signs within the normal limits.  Labs within the normal limits.  CTA chest showed significant airspace disease in the right upper lung and enlarged lymph nodes    ______________________________________________________________________  DISCHARGE SUMMARY/HOSPITAL COURSE:  for full details see H&P, daily progress notes, labs, consult notes.     Acute hypoxic respiratory failure due to COVID, and right upper lobe pneumonia received vancomycin and Zosyn and azithromycin improved to room air, discharged home in stable condition, seen by diabetes management and medication sent to the patient pharmacy.        _______________________________________________________________________  Patient seen and examined by me on discharge day.  Pertinent Findings:  Gen:    Not in distress  Chest: Clear

## 2024-04-09 NOTE — PLAN OF CARE
Problem: Discharge Planning  Goal: Discharge to home or other facility with appropriate resources  4/9/2024 0311 by Claudia Lynn RN  Outcome: Progressing  Flowsheets (Taken 4/8/2024 2100)  Discharge to home or other facility with appropriate resources: Identify barriers to discharge with patient and caregiver  4/8/2024 1357 by Lex Prasad RN  Outcome: Progressing  Flowsheets (Taken 4/6/2024 0800 by Keira Baird, RN)  Discharge to home or other facility with appropriate resources:   Identify barriers to discharge with patient and caregiver   Arrange for needed discharge resources and transportation as appropriate   Identify discharge learning needs (meds, wound care, etc)     Problem: Pain  Goal: Verbalizes/displays adequate comfort level or baseline comfort level  4/9/2024 0311 by Claudia Lynn RN  Outcome: Progressing  Flowsheets (Taken 4/8/2024 2100)  Verbalizes/displays adequate comfort level or baseline comfort level: Encourage patient to monitor pain and request assistance  4/8/2024 1357 by Lex Prasad RN  Outcome: Progressing  Flowsheets (Taken 4/8/2024 1357)  Verbalizes/displays adequate comfort level or baseline comfort level:   Encourage patient to monitor pain and request assistance   Administer analgesics based on type and severity of pain and evaluate response   Assess pain using appropriate pain scale     Problem: Safety - Adult  Goal: Free from fall injury  4/9/2024 0311 by Claudia Lynn RN  Outcome: Progressing  4/8/2024 1357 by Lex Prasad RN  Outcome: Progressing     Problem: Nutrition Deficit:  Goal: Optimize nutritional status  4/9/2024 0311 by Claudia Lynn RN  Outcome: Progressing  4/8/2024 1357 by Lex Prasad RN  Outcome: Progressing  Flowsheets (Taken 4/8/2024 1357)  Nutrient intake appropriate for improving, restoring, or maintaining nutritional needs: Assess nutritional status and recommend course of action     Problem: Respiratory -

## 2024-04-09 NOTE — PROGRESS NOTES
PCP hospital follow-up transitional care appointment has been scheduled with Dr. Eric Nazario on 4/11/24 1100. Pending patient discharge.  Patricia Gordon, Care Management Assistant

## 2024-04-09 NOTE — CARE COORDINATION
Pt is clear from CM standpoint for d/c.    Pt's family will transport at 1 pm.    Transition of Care Plan:     RUR: 8%  Prior Level of Functioning: Independent   Disposition: Home with son and no needs   If SNF or IPR: Date FOC offered:   Date FOC received:   Accepting facility:   Date authorization started with reference number:   Date authorization received and expires:   Follow up appointments: PCP  DME needed: No needs   Transportation at discharge: Pt's family   IM/IMM Medicare/ letter given: 4/9/24  Is patient a  and connected with [facility]? No              If yes, was  transfer form completed and [facility] notified? No  Caregiver Contact: Pt's son   Discharge Caregiver contacted prior to discharge? Pt was contacted   Care Conference needed? No  Barriers to discharge:  None    04/09/24 0900   Services At/After Discharge   Transition of Care Consult (CM Consult) N/A   Services At/After Discharge None    Resource Information Provided? No   Mode of Transport at Discharge Self   Confirm Follow Up Transport Self   Condition of Participation: Discharge Planning   The Plan for Transition of Care is related to the following treatment goals: Goal is to return home with follow up appointments   The Patient and/or Patient Representative was provided with a Choice of Provider? Patient   The Patient and/Or Patient Representative agree with the Discharge Plan? Yes   Freedom of Choice list was provided with basic dialogue that supports the patient's individualized plan of care/goals, treatment preferences, and shares the quality data associated with the providers?  Yes

## 2024-04-09 NOTE — PROGRESS NOTES

## 2024-04-09 NOTE — DIABETES MGMT
sensitivity     Additional orders  Continue carb consistent diet (60g CHO/meal)  Continue 10mg glipizide BID. Can increase am dose to 20mg if he continues to have pre-prandial hyperglycemia.  Continue 1000mg metformin BID  Noted 25 units Jardiance daily. Please hold if he becomes medically unstable.  Please encourage patient to participate in diabetes self care while in the hospital including allowing patient to use lancet for blood glucose monitoring and self-administer insulin injections.         Diabetes Discharge Plan   Medication  A1C is significantly elevated, 10.4%.    Resume metformin, amaryl and jardiance at PTA doses  Start Lantus VIAL at hospital based dose: 18 units daily. He is familiar with injections and simulated this while hospitalized  Insulin syringe with needle.   Referral  [x]        Outpatient diabetes education: referral placed   Additional orders  Will need a FUV with PCP within 1-2 weeks after hospital discharge for ongoing diabetes management   Asked him to monitor glucose twice daily- fasting and before bed.  This patient meets the criteria mandated by CMS for CGM use by virtue of:  -In-person evaluation of diabetes control by our service within the past 6 months.  -Having Type 2 Diabetes   -Is insulin treated   -Requiring frequent adjustments to their treatment regimen based on BGM or CGM testing results.   -Has been trained by our service for CGM use.          Initial Presentation   Irwin Pineda is a 73 y.o. male who presented to the ED 4/2/24 from recommendation of PCP for hypoxia.  LAB: , WBC 11.5  CTA Chest:  CTA CHEST W WO CONTRAST   Final Result       1. Negative for pulmonary embolus.   2. Significant airspace disease in the right lung likely due to infection.   3. Area of consolidation in the posterior left lower lobe likely related to   infection as well.   4. Bilateral hilar angel enlargement. Several enlarged lymph nodes in the   mediastinum. These are likely    Medications plan at discharge     Billing Code(s)   53674    Before making these care recommendations, I personally reviewed the hospitalization record, including notes, laboratory & diagnostic data and current medications, and examined the patient at the bedside (circumstances permitting) before determining care. More than fifty (50) percent of the time was spent in patient counseling and/or care coordination.  Total minutes: 25 minutes    ORLY Veliz  Diabetes Clinical Nurse Specialist  [facility]  Access via [employer]

## 2025-02-17 ENCOUNTER — TRANSCRIBE ORDERS (OUTPATIENT)
Facility: HOSPITAL | Age: 75
End: 2025-02-17

## 2025-02-17 DIAGNOSIS — L03.116 CELLULITIS OF LEFT FOOT: Primary | ICD-10-CM

## 2025-03-19 ENCOUNTER — HOSPITAL ENCOUNTER (OUTPATIENT)
Facility: HOSPITAL | Age: 75
Discharge: HOME | End: 2025-03-22
Payer: MEDICARE

## 2025-03-19 DIAGNOSIS — L03.116 CELLULITIS OF LEFT FOOT: ICD-10-CM

## 2025-03-19 PROCEDURE — 73700 CT LOWER EXTREMITY W/O DYE: CPT
